# Patient Record
Sex: FEMALE | Race: WHITE | NOT HISPANIC OR LATINO | Employment: FULL TIME | URBAN - METROPOLITAN AREA
[De-identification: names, ages, dates, MRNs, and addresses within clinical notes are randomized per-mention and may not be internally consistent; named-entity substitution may affect disease eponyms.]

---

## 2018-02-16 ENCOUNTER — OFFICE VISIT (OUTPATIENT)
Dept: RETAIL CLINIC | Facility: CLINIC | Age: 28
End: 2018-02-16

## 2018-02-16 VITALS
SYSTOLIC BLOOD PRESSURE: 106 MMHG | OXYGEN SATURATION: 99 % | DIASTOLIC BLOOD PRESSURE: 68 MMHG | TEMPERATURE: 98.3 F | RESPIRATION RATE: 14 BRPM | HEART RATE: 69 BPM

## 2018-02-16 DIAGNOSIS — H66.002 ACUTE SUPPURATIVE OTITIS MEDIA OF LEFT EAR WITHOUT SPONTANEOUS RUPTURE OF TYMPANIC MEMBRANE, RECURRENCE NOT SPECIFIED: Primary | ICD-10-CM

## 2018-02-16 PROBLEM — R50.9 FEVER: Status: ACTIVE | Noted: 2018-02-16

## 2018-02-16 PROBLEM — H92.02 LEFT EAR PAIN: Status: ACTIVE | Noted: 2018-02-16

## 2018-02-16 PROCEDURE — 99213 OFFICE O/P EST LOW 20 MIN: CPT | Performed by: NURSE PRACTITIONER

## 2018-02-16 RX ORDER — AMOXICILLIN 875 MG/1
875 TABLET, COATED ORAL 2 TIMES DAILY
Qty: 20 TABLET | Refills: 0 | OUTPATIENT
Start: 2018-02-16 | End: 2019-03-25

## 2018-02-16 NOTE — PATIENT INSTRUCTIONS
Otitis Media, Adult  Otitis media is redness, soreness, and puffiness (swelling) in the space just behind your eardrum (middle ear). It may be caused by allergies or infection. It often happens along with a cold.  Follow these instructions at home:  · Take your medicine as told. Finish it even if you start to feel better.  · Only take over-the-counter or prescription medicines for pain, discomfort, or fever as told by your doctor.  · Follow up with your doctor as told.  Contact a doctor if:  · You have otitis media only in one ear, or bleeding from your nose, or both.  · You notice a lump on your neck.  · You are not getting better in 3-5 days.  · You feel worse instead of better.  Get help right away if:  · You have pain that is not helped with medicine.  · You have puffiness, redness, or pain around your ear.  · You get a stiff neck.  · You cannot move part of your face (paralysis).  · You notice that the bone behind your ear hurts when you touch it.  This information is not intended to replace advice given to you by your health care provider. Make sure you discuss any questions you have with your health care provider.  Document Released: 06/05/2009 Document Revised: 05/25/2017 Document Reviewed: 07/15/2014  [employer] Patient Education © 2017 [employer]    Earache, Adult  An earache, or ear pain, can be caused by many things, including:  · An infection.  · Ear wax buildup.  · Ear pressure.  · Something in the ear that should not be there (foreign body).  · A sore throat.  · Tooth problems.  · Jaw problems.  Treatment of the earache will depend on the cause. If the cause is not clear or cannot be determined, you may need to watch your symptoms until your earache goes away or until a cause is found.  Follow these instructions at home:  Pay attention to any changes in your symptoms. Take these actions to help with your pain:  · Take or apply over-the-counter and prescription medicines only as told by  your health care provider.  · If you were prescribed an antibiotic medicine, use it as told by your health care provider. Do not stop using the antibiotic even if you start to feel better.  · Do not put anything in your ear other than medicine that is prescribed by your health care provider.  · If directed, apply heat to the affected area as often as told by your health care provider. Use the heat source that your health care provider recommends, such as a moist heat pack or a heating pad.  ¨ Place a towel between your skin and the heat source.  ¨ Leave the heat on for 20-30 minutes.  ¨ Remove the heat if your skin turns bright red. This is especially important if you are unable to feel pain, heat, or cold. You may have a greater risk of getting burned.  · If directed, put ice on the ear:  ¨ Put ice in a plastic bag.  ¨ Place a towel between your skin and the bag.  ¨ Leave the ice on for 20 minutes, 2-3 times a day.  · Try resting in an upright position instead of lying down. This may help to reduce pressure in your ear and relieve pain.  · Chew gum if it helps to relieve your ear pain.  · Treat any allergies as told by your health care provider.  · Keep all follow-up visits as told by your health care provider. This is important.  Contact a health care provider if:  · Your pain does not improve within 2 days.  · Your earache gets worse.  · You have new symptoms.  · You have a fever.  Get help right away if:  · You have a severe headache.  · You have a stiff neck.  · You have trouble swallowing.  · You have redness or swelling behind your ear.  · You have fluid or blood coming from your ear.  · You have hearing loss.  · You feel dizzy.  This information is not intended to replace advice given to you by your health care provider. Make sure you discuss any questions you have with your health care provider.  Document Released: 08/04/2005 Document Revised: 08/15/2017 Document Reviewed: 06/12/2017  [employer]  Patient Education © 2017 [employer]  Talked to the patient about the diagnosis and educate the patient and advise to visit to PCP if the symptoms worsens

## 2018-02-16 NOTE — PROGRESS NOTES
Subjective   Zora Ashraf is a 27 y.o. female.     Earache    There is pain in the left ear. This is a new problem. The current episode started yesterday. The maximum temperature recorded prior to her arrival was 100.4 - 100.9 F. The fever has been present for 3 to 4 days. The pain is at a severity of 6/10. The pain is moderate. Associated symptoms include coughing. She has tried acetaminophen for the symptoms. The treatment provided mild relief. There is no history of a chronic ear infection or hearing loss.   Fever    This is a new problem. The current episode started yesterday. The problem has been waxing and waning. The maximum temperature noted was 100 to 100.9 F. The temperature was taken using an oral thermometer. Associated symptoms include congestion, coughing and ear pain. Pertinent negatives include no wheezing. She has tried acetaminophen for the symptoms. The treatment provided mild relief.        The following portions of the patient's history were reviewed and updated as appropriate: allergies, current medications, past family history, past medical history, past social history, past surgical history and problem list.    Review of Systems   Constitutional: Positive for fever.   HENT: Positive for congestion, ear pain and postnasal drip.          Left ear pain   Eyes: Negative.    Respiratory: Positive for cough. Negative for chest tightness and wheezing.    Cardiovascular: Negative.    Gastrointestinal: Negative.        Objective   Physical Exam   Constitutional: She appears well-developed and well-nourished.   HENT:   Head: Normocephalic and atraumatic.   Right Ear: External ear normal.   Left Ear: Tympanic membrane is injected, erythematous and bulging. Tympanic membrane mobility is abnormal.   Nose: Mucosal edema present. Right sinus exhibits no maxillary sinus tenderness and no frontal sinus tenderness. Left sinus exhibits no maxillary sinus tenderness and no frontal sinus tenderness.    Mouth/Throat: No oropharyngeal exudate, posterior oropharyngeal edema, posterior oropharyngeal erythema or tonsillar abscesses.   Eyes: Pupils are equal, round, and reactive to light.   Neck: Normal range of motion.   Cardiovascular: Normal rate, regular rhythm and normal heart sounds.    Pulmonary/Chest: Effort normal and breath sounds normal. No respiratory distress. She has no decreased breath sounds. She has no wheezes. She has no rhonchi. She has no rales. She exhibits no tenderness.   Abdominal: Soft. Bowel sounds are normal.   Nursing note and vitals reviewed.      Assessment/Plan   Zora was seen today for earache.    Diagnoses and all orders for this visit:    Acute suppurative otitis media of left ear without spontaneous rupture of tympanic membrane, recurrence not specified    Other orders  -     amoxicillin (AMOXIL) 875 MG tablet; Take 1 tablet by mouth 2 (Two) Times a Day.      Talked to the patient about the diagnosis and educate the patient and advise to visit to PCP if the symptoms worsens

## 2019-03-27 ENCOUNTER — OFFICE VISIT (OUTPATIENT)
Dept: FAMILY MEDICINE CLINIC | Facility: CLINIC | Age: 29
End: 2019-03-27

## 2019-03-27 VITALS
WEIGHT: 147 LBS | BODY MASS INDEX: 23.07 KG/M2 | OXYGEN SATURATION: 99 % | HEART RATE: 80 BPM | HEIGHT: 67 IN | RESPIRATION RATE: 16 BRPM | SYSTOLIC BLOOD PRESSURE: 110 MMHG | TEMPERATURE: 97.7 F | DIASTOLIC BLOOD PRESSURE: 70 MMHG

## 2019-03-27 DIAGNOSIS — L65.9 HAIR LOSS: ICD-10-CM

## 2019-03-27 DIAGNOSIS — R19.7 DIARRHEA, UNSPECIFIED TYPE: Primary | ICD-10-CM

## 2019-03-27 DIAGNOSIS — R53.82 CHRONIC FATIGUE: ICD-10-CM

## 2019-03-27 DIAGNOSIS — Z13.220 LIPID SCREENING: ICD-10-CM

## 2019-03-27 PROBLEM — H92.02 LEFT EAR PAIN: Status: RESOLVED | Noted: 2018-02-16 | Resolved: 2019-03-27

## 2019-03-27 PROBLEM — R50.9 FEVER: Status: RESOLVED | Noted: 2018-02-16 | Resolved: 2019-03-27

## 2019-03-27 PROCEDURE — 99203 OFFICE O/P NEW LOW 30 MIN: CPT | Performed by: NURSE PRACTITIONER

## 2019-03-27 NOTE — PROGRESS NOTES
Subjective   Zora Ashraf is a 28 y.o. female. Patient is here today for   Chief Complaint   Patient presents with   • Establish Care     np   • Fatigue     pt states has a history of lyme disease 2x    • Diarrhea     pt states has been going on for 6 wks 2/13/19          Vitals:    03/27/19 1306   BP: 110/70   Pulse: 80   Resp: 16   Temp: 97.7 °F (36.5 °C)   SpO2: 99%     The following portions of the patient's history were reviewed and updated as appropriate: allergies, current medications, past family history, past medical history, past social history, past surgical history and problem list.    Past Medical History:   Diagnosis Date   • Allergic    • Endometriosis    • PCOS (polycystic ovarian syndrome)       No Known Allergies   Social History     Socioeconomic History   • Marital status:      Spouse name: Not on file   • Number of children: Not on file   • Years of education: Not on file   • Highest education level: Not on file   Tobacco Use   • Smoking status: Never Smoker   • Smokeless tobacco: Never Used   Substance and Sexual Activity   • Alcohol use: Yes     Alcohol/week: 1.2 oz     Types: 2 Cans of beer per week     Comment: weekly   • Drug use: Defer   • Sexual activity: Defer        Current Outpatient Medications:   •  MAGNESIUM CITRATE PO, Take  by mouth., Disp: , Rfl:      Objective     History of Present Illness  Zora is here to establish care . She has no previous PCP. She has a history of PCOS and has had lyme disease twice in the past. She works as a teacher.   She c/o diarrhea 4-5 times a day for 6 weeks. She describes it as watery. She denies any abdominal pain, melena, or bloody stools. She has had weakness, fatigue, hair loss, and brittle nails. Her toe nails are falling are peeling too.   She recently went to [place] but it was after her symptoms started. She has not been on any antibiotics.   She went to the Urgent care a few days ago and they did not do anything for her but told  her to follow up with a PCP  She is fasting today for labs.     Review of Systems   Constitutional: Positive for chills, diaphoresis and fatigue. Negative for unexpected weight change.   HENT: Negative for congestion.    Eyes: Negative for visual disturbance.   Respiratory: Negative for cough, chest tightness and shortness of breath.    Cardiovascular: Negative for chest pain, palpitations and leg swelling.   Genitourinary: Negative.    Musculoskeletal: Negative for arthralgias.   Skin:        Hair loss, nail changes , brittle nails, toes peeling    Neurological: Positive for weakness and headaches. Negative for dizziness and numbness. Tremors: generalized        Physical Exam   Constitutional: Vital signs are normal. She appears well-developed and well-nourished. She appears ill. No distress.   HENT:   Head: Normocephalic.   Right Ear: Tympanic membrane and ear canal normal.   Left Ear: Tympanic membrane and ear canal normal.   Nose: Nose normal.   Mouth/Throat: Uvula is midline and oropharynx is clear and moist.   Cardiovascular: Normal rate, regular rhythm and normal heart sounds.   Pulmonary/Chest: Effort normal and breath sounds normal.   Abdominal: Soft. Normal appearance and bowel sounds are normal. There is no tenderness.   Neurological: She is alert.   Skin: Skin is warm and dry. There is pallor.       ASSESSMENT     Problem List Items Addressed This Visit     None      Visit Diagnoses     Diarrhea, unspecified type    -  Primary    Relevant Orders    Comprehensive Metabolic Panel    Giardia / Cryptosporidium Screen - Stool, Per Rectum    Stool Culture - Stool, Per Rectum    Clostridium Difficile EIA - Stool, Per Rectum    Chronic fatigue        Relevant Orders    CBC & Differential    Vitamin B12    Vitamin D 25 Hydroxy    Iron Profile    TSH Rfx On Abnormal To Free T4    Lyme, IgM, Early Test / Reflex    Hair loss        Relevant Orders    Vitamin D 25 Hydroxy    Iron Profile    Lipid screening         Relevant Orders    Lipid Panel With LDL / HDL Ratio          PLAN    Will check labs and call with results  Advised patient to bring in stool specimen. If negative may refer to GI for colonoscopy  Follow up as needed

## 2019-03-29 ENCOUNTER — TELEPHONE (OUTPATIENT)
Dept: FAMILY MEDICINE CLINIC | Facility: CLINIC | Age: 29
End: 2019-03-29

## 2019-03-29 NOTE — TELEPHONE ENCOUNTER
CALLED AND S/W PT AND ADVISED LAB RESULTS WERE NOT BACK YET AND THAT [facility] WOULD CALL PATIENT WHEN RESULTS CAME IN. SHE VOICED UNDERSTANDING.     ----- Message from Orly Pham sent at 3/29/2019 10:52 AM EDT -----  WANTING LAB RESULTS FROM [facility] 3/27/19 PLEASE CALL     [phone]

## 2019-04-01 ENCOUNTER — TELEPHONE (OUTPATIENT)
Dept: FAMILY MEDICINE CLINIC | Facility: CLINIC | Age: 29
End: 2019-04-01

## 2019-04-01 DIAGNOSIS — R19.7 DIARRHEA, UNSPECIFIED TYPE: Primary | ICD-10-CM

## 2019-04-01 LAB
BACTERIA SPEC CULT: NORMAL
BACTERIA SPEC CULT: NORMAL
C DIFF TOX A+B STL QL IA: NEGATIVE
CAMPYLOBACTER STL CULT: NORMAL
CRYPTOSP AG STL QL IA: NEGATIVE
E COLI SXT STL QL IA: NEGATIVE
G LAMBLIA AG STL QL IA: NEGATIVE
SALM + SHIG STL CULT: NORMAL

## 2019-04-01 NOTE — TELEPHONE ENCOUNTER
I called [facility] and her labs overall looked good   The lyme is still pending  Stool studies were negative  Will refer to GI for diarrhea for eval and possible cscope  Discussed seeing derm for hair loss but declined, is seeing allergist   Follow up as needed

## 2019-04-02 ENCOUNTER — TELEPHONE (OUTPATIENT)
Dept: FAMILY MEDICINE CLINIC | Facility: CLINIC | Age: 29
End: 2019-04-02

## 2019-04-02 DIAGNOSIS — R76.8 BORDERLINE RESULTS ON SEROLOGIC TESTING FOR LYME DISEASE: Primary | ICD-10-CM

## 2019-04-02 LAB
25(OH)D3+25(OH)D2 SERPL-MCNC: 30.3 NG/ML (ref 30–100)
ALBUMIN SERPL-MCNC: 5 G/DL (ref 3.5–5.2)
ALBUMIN/GLOB SERPL: 1.7 G/DL
ALP SERPL-CCNC: 37 U/L (ref 39–117)
ALT SERPL-CCNC: 19 U/L (ref 1–33)
AST SERPL-CCNC: 24 U/L (ref 1–32)
B BURGDOR IGG PATRN SER IB-IMP: NEGATIVE
B BURGDOR IGM PATRN SER IB-IMP: NEGATIVE
B BURGDOR IGM SER IA-ACNC: 1.17 INDEX (ref 0–0.79)
B BURGDOR18KD IGG SER QL IB: ABNORMAL
B BURGDOR23KD IGG SER QL IB: ABNORMAL
B BURGDOR23KD IGM SER QL IB: ABNORMAL
B BURGDOR28KD IGG SER QL IB: ABNORMAL
B BURGDOR30KD IGG SER QL IB: ABNORMAL
B BURGDOR39KD IGG SER QL IB: ABNORMAL
B BURGDOR39KD IGM SER QL IB: ABNORMAL
B BURGDOR41KD IGG SER QL IB: ABNORMAL
B BURGDOR41KD IGM SER QL IB: PRESENT
B BURGDOR45KD IGG SER QL IB: ABNORMAL
B BURGDOR58KD IGG SER QL IB: ABNORMAL
B BURGDOR66KD IGG SER QL IB: ABNORMAL
B BURGDOR93KD IGG SER QL IB: ABNORMAL
BASOPHILS # BLD AUTO: 0.04 10*3/MM3 (ref 0–0.2)
BASOPHILS NFR BLD AUTO: 0.6 % (ref 0–1.5)
BILIRUB SERPL-MCNC: 0.4 MG/DL (ref 0.2–1.2)
BUN SERPL-MCNC: 10 MG/DL (ref 6–20)
BUN/CREAT SERPL: 13.2 (ref 7–25)
CALCIUM SERPL-MCNC: 10 MG/DL (ref 8.6–10.5)
CHLORIDE SERPL-SCNC: 102 MMOL/L (ref 98–107)
CHOLEST SERPL-MCNC: 180 MG/DL (ref 0–200)
CO2 SERPL-SCNC: 25.4 MMOL/L (ref 22–29)
CREAT SERPL-MCNC: 0.76 MG/DL (ref 0.57–1)
EOSINOPHIL # BLD AUTO: 0.14 10*3/MM3 (ref 0–0.4)
EOSINOPHIL NFR BLD AUTO: 2.1 % (ref 0.3–6.2)
ERYTHROCYTE [DISTWIDTH] IN BLOOD BY AUTOMATED COUNT: 11.9 % (ref 12.3–15.4)
GLOBULIN SER CALC-MCNC: 2.9 GM/DL
GLUCOSE SERPL-MCNC: 101 MG/DL (ref 65–99)
HCT VFR BLD AUTO: 40.5 % (ref 34–46.6)
HDLC SERPL-MCNC: 62 MG/DL (ref 40–60)
HGB BLD-MCNC: 13 G/DL (ref 12–15.9)
IMM GRANULOCYTES # BLD AUTO: 0.02 10*3/MM3 (ref 0–0.05)
IMM GRANULOCYTES NFR BLD AUTO: 0.3 % (ref 0–0.5)
IRON SATN MFR SERPL: 22 % (ref 20–50)
IRON SERPL-MCNC: 105 MCG/DL (ref 37–145)
LDLC SERPL CALC-MCNC: 109 MG/DL (ref 0–100)
LDLC/HDLC SERPL: 1.76 {RATIO}
LYMPHOCYTES # BLD AUTO: 2.2 10*3/MM3 (ref 0.7–3.1)
LYMPHOCYTES NFR BLD AUTO: 33.1 % (ref 19.6–45.3)
MCH RBC QN AUTO: 29.9 PG (ref 26.6–33)
MCHC RBC AUTO-ENTMCNC: 32.1 G/DL (ref 31.5–35.7)
MCV RBC AUTO: 93.1 FL (ref 79–97)
MONOCYTES # BLD AUTO: 0.51 10*3/MM3 (ref 0.1–0.9)
MONOCYTES NFR BLD AUTO: 7.7 % (ref 5–12)
NEUTROPHILS # BLD AUTO: 3.73 10*3/MM3 (ref 1.4–7)
NEUTROPHILS NFR BLD AUTO: 56.2 % (ref 42.7–76)
NRBC BLD AUTO-RTO: 0 /100 WBC (ref 0–0)
PLATELET # BLD AUTO: 362 10*3/MM3 (ref 140–450)
POTASSIUM SERPL-SCNC: 4.4 MMOL/L (ref 3.5–5.2)
PROT SERPL-MCNC: 7.9 G/DL (ref 6–8.5)
RBC # BLD AUTO: 4.35 10*6/MM3 (ref 3.77–5.28)
SODIUM SERPL-SCNC: 140 MMOL/L (ref 136–145)
T4 FREE SERPL-MCNC: NORMAL NG/DL
TIBC SERPL-MCNC: 475 MCG/DL
TRIGL SERPL-MCNC: 45 MG/DL (ref 0–150)
TSH SERPL DL<=0.005 MIU/L-ACNC: 1.46 MIU/ML (ref 0.27–4.2)
UIBC SERPL-MCNC: 370 MCG/DL
VIT B12 SERPL-MCNC: 808 PG/ML (ref 211–946)
VLDLC SERPL CALC-MCNC: 9 MG/DL (ref 5–40)
WBC # BLD AUTO: 6.64 10*3/MM3 (ref 3.4–10.8)

## 2019-04-02 NOTE — TELEPHONE ENCOUNTER
Reviewed lyme IgM is elevated but equivocal   One of the bands is positive   I called ID and spoke with Florence since Sidra is out of town and she recommended I send a referral with the labs and someone will look at it and the labs and send it back if she does not need to be seen  I called the patient and left a message to discuss this

## 2019-04-02 NOTE — TELEPHONE ENCOUNTER
Discussed with the patient (see previous telephone note)  Will put in a referral and see if infectious disease will see her  I will let her know as soon as I hear something.   ----- Message from Marla Roberts sent at 4/2/2019 10:39 AM EDT -----  PATIENT WAS RETURNING YOUR CALL ABOUT RESULTS. PLEASE CALL PT BACK WHEN POSSIBLE -[phone].    THANK YOU

## 2019-04-08 ENCOUNTER — TELEPHONE (OUTPATIENT)
Dept: FAMILY MEDICINE CLINIC | Facility: CLINIC | Age: 29
End: 2019-04-08

## 2019-04-08 NOTE — TELEPHONE ENCOUNTER
CALLED PT AND LET HER KNOW     ----- Message from GWENDOLYN Marvin sent at 4/8/2019  9:03 AM EDT -----  Contact: PT   No I did not check pancreatic enzymes on her   ----- Message -----  From: Zhane Estes MA  Sent: 4/5/2019   4:05 PM  To: GWENDOLYN Marvin     PLEASE ADVISE   ----- Message -----  From: Victorina Yao RegSched Rep  Sent: 4/5/2019   3:44 PM  To: Zhane Estes MA    PT CALLED IN TO SEE IF ANY OF THE BLOOD WORK THAT WAS PREFORMED ON 3/27/19 INCLUDED CHECKING HER PANCREAS ENZYMES.  PLEASE CONTACT PT AND ADVISE.

## 2019-04-08 NOTE — TELEPHONE ENCOUNTER
I called the patient and discussed this. Pt had skin testing at an allergist and is allergic to multiple things. One of those is beef. A beef allergy can be associated with [place] tick bites so they recommend a lab test for beef allergy which is more accurate than a skin test  I reviewed her labs and answered her questions. Will see what the elimination diet does and what the gastro says  Will consider ordering a Alpha Gal IgE blood test   ----- Message from Zhane Estes MA sent at 4/8/2019  1:17 PM EDT -----   PLEASE ADVISE THANK YOU   ----- Message -----  From: Victorina Yao RegSched Rep  Sent: 4/8/2019  12:46 PM  To: Zhane Estes MA    PT WAS SEEN AT [facility]ER[facility] DR ON 4/6/19 AND THEY FOUND ENZYMES OF LYME DISEASE BUT THEY SENT IT OFF AND TO INFECTIOUS DISEASE WHICH BELIEVE THE LYME ENZYMES ARE THERE FROM WHEN SHE HAD LYME A FEW YEARS AGO.  HOWEVER THE ALLERGIST MENTIONED THAT SHE IS ALLERGIC TO BEEF, WHICH SHE HAS NOT BEEN ALLERGIC TO IN THE PAST.  HER ALLERGY DR ADVISED THAT THERE IS A DIFFERENT TIC ([place] TIC).  PT IS REQUESTING THAT [facility] LOOK OVER HER LABS AND SEE WHAT SHE THINKS.  PLEASE CONTACT PT [phone]

## 2019-04-10 ENCOUNTER — HOSPITAL ENCOUNTER (EMERGENCY)
Facility: HOSPITAL | Age: 29
Discharge: HOME | End: 2019-04-10
Attending: EMERGENCY MEDICINE | Admitting: EMERGENCY MEDICINE

## 2019-04-10 ENCOUNTER — APPOINTMENT (OUTPATIENT)
Dept: CT IMAGING | Facility: HOSPITAL | Age: 29
End: 2019-04-10

## 2019-04-10 ENCOUNTER — OFFICE VISIT (OUTPATIENT)
Dept: FAMILY MEDICINE CLINIC | Facility: CLINIC | Age: 29
End: 2019-04-10

## 2019-04-10 VITALS
OXYGEN SATURATION: 99 % | RESPIRATION RATE: 18 BRPM | DIASTOLIC BLOOD PRESSURE: 76 MMHG | HEART RATE: 89 BPM | HEIGHT: 67 IN | SYSTOLIC BLOOD PRESSURE: 120 MMHG | BODY MASS INDEX: 23.13 KG/M2 | WEIGHT: 147.4 LBS | TEMPERATURE: 98.2 F

## 2019-04-10 VITALS
SYSTOLIC BLOOD PRESSURE: 111 MMHG | BODY MASS INDEX: 23.07 KG/M2 | HEIGHT: 67 IN | OXYGEN SATURATION: 98 % | RESPIRATION RATE: 16 BRPM | HEART RATE: 70 BPM | TEMPERATURE: 98.3 F | DIASTOLIC BLOOD PRESSURE: 58 MMHG | WEIGHT: 147 LBS

## 2019-04-10 DIAGNOSIS — R19.7 DIARRHEA, UNSPECIFIED TYPE: Primary | ICD-10-CM

## 2019-04-10 DIAGNOSIS — R19.7 DIARRHEA, UNSPECIFIED TYPE: ICD-10-CM

## 2019-04-10 DIAGNOSIS — R10.30 LOWER ABDOMINAL PAIN: Primary | ICD-10-CM

## 2019-04-10 LAB
ADV 40+41 DNA STL QL NAA+NON-PROBE: NOT DETECTED
ALBUMIN SERPL-MCNC: 4.9 G/DL (ref 3.5–5.2)
ALBUMIN/GLOB SERPL: 1.8 G/DL
ALP SERPL-CCNC: 33 U/L (ref 39–117)
ALT SERPL W P-5'-P-CCNC: 20 U/L (ref 1–33)
ANION GAP SERPL CALCULATED.3IONS-SCNC: 12.7 MMOL/L
AST SERPL-CCNC: 28 U/L (ref 1–32)
ASTRO TYP 1-8 RNA STL QL NAA+NON-PROBE: NOT DETECTED
BASOPHILS # BLD AUTO: 0.04 10*3/MM3 (ref 0–0.2)
BASOPHILS NFR BLD AUTO: 0.5 % (ref 0–1.5)
BILIRUB SERPL-MCNC: 0.5 MG/DL (ref 0.2–1.2)
BILIRUB UR QL STRIP: NEGATIVE
BUN BLD-MCNC: 12 MG/DL (ref 6–20)
BUN/CREAT SERPL: 15 (ref 7–25)
C CAYETANENSIS DNA STL QL NAA+NON-PROBE: NOT DETECTED
CALCIUM SPEC-SCNC: 9.2 MG/DL (ref 8.6–10.5)
CAMPY SP DNA.DIARRHEA STL QL NAA+PROBE: NOT DETECTED
CHLORIDE SERPL-SCNC: 96 MMOL/L (ref 98–107)
CLARITY UR: ABNORMAL
CO2 SERPL-SCNC: 25.3 MMOL/L (ref 22–29)
COLOR UR: YELLOW
CREAT BLD-MCNC: 0.8 MG/DL (ref 0.57–1)
CRYPTOSP STL CULT: NOT DETECTED
DEPRECATED RDW RBC AUTO: 38 FL (ref 37–54)
E COLI DNA SPEC QL NAA+PROBE: NOT DETECTED
E HISTOLYT AG STL-ACNC: NOT DETECTED
EAEC PAA PLAS AGGR+AATA ST NAA+NON-PRB: NOT DETECTED
EC STX1 + STX2 GENES STL NAA+PROBE: NOT DETECTED
EOSINOPHIL # BLD AUTO: 0.08 10*3/MM3 (ref 0–0.4)
EOSINOPHIL NFR BLD AUTO: 1 % (ref 0.3–6.2)
EPEC EAE GENE STL QL NAA+NON-PROBE: NOT DETECTED
ERYTHROCYTE [DISTWIDTH] IN BLOOD BY AUTOMATED COUNT: 11.5 % (ref 12.3–15.4)
ETEC LTA+ST1A+ST1B TOX ST NAA+NON-PROBE: NOT DETECTED
G LAMBLIA DNA SPEC QL NAA+PROBE: NOT DETECTED
GFR SERPL CREATININE-BSD FRML MDRD: 84 ML/MIN/1.73
GLOBULIN UR ELPH-MCNC: 2.7 GM/DL
GLUCOSE BLD-MCNC: 86 MG/DL (ref 65–99)
GLUCOSE UR STRIP-MCNC: NEGATIVE MG/DL
HCG SERPL QL: NEGATIVE
HCT VFR BLD AUTO: 37.4 % (ref 34–46.6)
HGB BLD-MCNC: 12.4 G/DL (ref 12–15.9)
HGB UR QL STRIP.AUTO: NEGATIVE
HOLD SPECIMEN: NORMAL
HOLD SPECIMEN: NORMAL
IMM GRANULOCYTES # BLD AUTO: 0.03 10*3/MM3 (ref 0–0.05)
IMM GRANULOCYTES NFR BLD AUTO: 0.4 % (ref 0–0.5)
KETONES UR QL STRIP: ABNORMAL
LEUKOCYTE ESTERASE UR QL STRIP.AUTO: NEGATIVE
LIPASE SERPL-CCNC: 23 U/L (ref 13–60)
LYMPHOCYTES # BLD AUTO: 1.89 10*3/MM3 (ref 0.7–3.1)
LYMPHOCYTES NFR BLD AUTO: 23.5 % (ref 19.6–45.3)
MCH RBC QN AUTO: 30 PG (ref 26.6–33)
MCHC RBC AUTO-ENTMCNC: 33.2 G/DL (ref 31.5–35.7)
MCV RBC AUTO: 90.6 FL (ref 79–97)
MONOCYTES # BLD AUTO: 0.57 10*3/MM3 (ref 0.1–0.9)
MONOCYTES NFR BLD AUTO: 7.1 % (ref 5–12)
NEUTROPHILS # BLD AUTO: 5.43 10*3/MM3 (ref 1.4–7)
NEUTROPHILS NFR BLD AUTO: 67.5 % (ref 42.7–76)
NITRITE UR QL STRIP: NEGATIVE
NOROVIRUS GI+II RNA STL QL NAA+NON-PROBE: NOT DETECTED
NRBC BLD AUTO-RTO: 0 /100 WBC (ref 0–0)
P SHIGELLOIDES DNA STL QL NAA+NON-PROBE: NOT DETECTED
PH UR STRIP.AUTO: 8.5 [PH] (ref 5–8)
PLATELET # BLD AUTO: 313 10*3/MM3 (ref 140–450)
PMV BLD AUTO: 11 FL (ref 6–12)
POTASSIUM BLD-SCNC: 3.9 MMOL/L (ref 3.5–5.2)
PROT SERPL-MCNC: 7.6 G/DL (ref 6–8.5)
PROT UR QL STRIP: NEGATIVE
RBC # BLD AUTO: 4.13 10*6/MM3 (ref 3.77–5.28)
RV RNA STL NAA+PROBE: NOT DETECTED
SALMONELLA DNA SPEC QL NAA+PROBE: NOT DETECTED
SAPO I+II+IV+V RNA STL QL NAA+NON-PROBE: NOT DETECTED
SHIGELLA SP+EIEC IPAH STL QL NAA+PROBE: NOT DETECTED
SODIUM BLD-SCNC: 134 MMOL/L (ref 136–145)
SP GR UR STRIP: >=1.03 (ref 1–1.03)
UROBILINOGEN UR QL STRIP: ABNORMAL
V CHOLERAE DNA SPEC QL NAA+PROBE: NOT DETECTED
VIBRIO DNA SPEC NAA+PROBE: NOT DETECTED
WBC NRBC COR # BLD: 8.04 10*3/MM3 (ref 3.4–10.8)
WHOLE BLOOD HOLD SPECIMEN: NORMAL
WHOLE BLOOD HOLD SPECIMEN: NORMAL
YERSINIA STL CULT: NOT DETECTED

## 2019-04-10 PROCEDURE — 80053 COMPREHEN METABOLIC PANEL: CPT | Performed by: PHYSICIAN ASSISTANT

## 2019-04-10 PROCEDURE — 81003 URINALYSIS AUTO W/O SCOPE: CPT | Performed by: PHYSICIAN ASSISTANT

## 2019-04-10 PROCEDURE — 99214 OFFICE O/P EST MOD 30 MIN: CPT | Performed by: NURSE PRACTITIONER

## 2019-04-10 PROCEDURE — 84703 CHORIONIC GONADOTROPIN ASSAY: CPT | Performed by: PHYSICIAN ASSISTANT

## 2019-04-10 PROCEDURE — 99284 EMERGENCY DEPT VISIT MOD MDM: CPT

## 2019-04-10 PROCEDURE — 25010000002 IOPAMIDOL 61 % SOLUTION: Performed by: EMERGENCY MEDICINE

## 2019-04-10 PROCEDURE — 74177 CT ABD & PELVIS W/CONTRAST: CPT

## 2019-04-10 PROCEDURE — 87507 IADNA-DNA/RNA PROBE TQ 12-25: CPT | Performed by: PHYSICIAN ASSISTANT

## 2019-04-10 PROCEDURE — 85025 COMPLETE CBC W/AUTO DIFF WBC: CPT | Performed by: PHYSICIAN ASSISTANT

## 2019-04-10 PROCEDURE — 83690 ASSAY OF LIPASE: CPT | Performed by: PHYSICIAN ASSISTANT

## 2019-04-10 RX ORDER — IBUPROFEN 800 MG/1
800 TABLET ORAL ONCE
Status: COMPLETED | OUTPATIENT
Start: 2019-04-10 | End: 2019-04-10

## 2019-04-10 RX ORDER — SODIUM CHLORIDE 0.9 % (FLUSH) 0.9 %
10 SYRINGE (ML) INJECTION AS NEEDED
Status: DISCONTINUED | OUTPATIENT
Start: 2019-04-10 | End: 2019-04-10 | Stop reason: HOSPADM

## 2019-04-10 RX ORDER — DIPHENOXYLATE HYDROCHLORIDE AND ATROPINE SULFATE 2.5; .025 MG/1; MG/1
1 TABLET ORAL 4 TIMES DAILY PRN
Qty: 30 TABLET | Refills: 0 | Status: SHIPPED | OUTPATIENT
Start: 2019-04-10 | End: 2019-04-10

## 2019-04-10 RX ADMIN — SODIUM CHLORIDE, POTASSIUM CHLORIDE, SODIUM LACTATE AND CALCIUM CHLORIDE 1000 ML: 600; 310; 30; 20 INJECTION, SOLUTION INTRAVENOUS at 16:24

## 2019-04-10 RX ADMIN — IBUPROFEN 800 MG: 800 TABLET, FILM COATED ORAL at 17:12

## 2019-04-10 RX ADMIN — IOPAMIDOL 85 ML: 612 INJECTION, SOLUTION INTRAVENOUS at 17:27

## 2019-04-10 NOTE — PROGRESS NOTES
Subjective   Zora Ashraf is a 28 y.o. female. Patient is here today for   Chief Complaint   Patient presents with   • Diarrhea     pt still is complaining of diarrhea          Vitals:    04/10/19 1331   BP: 120/76   Pulse: 89   Resp: 18   Temp: 98.2 °F (36.8 °C)   SpO2: 99%     The following portions of the patient's history were reviewed and updated as appropriate: allergies, current medications, past family history, past medical history, past social history, past surgical history and problem list.    Past Medical History:   Diagnosis Date   • Allergic    • Endometriosis    • PCOS (polycystic ovarian syndrome)       No Known Allergies   Social History     Socioeconomic History   • Marital status:      Spouse name: Not on file   • Number of children: Not on file   • Years of education: Not on file   • Highest education level: Not on file   Tobacco Use   • Smoking status: Never Smoker   • Smokeless tobacco: Never Used   Substance and Sexual Activity   • Alcohol use: Yes     Alcohol/week: 1.2 oz     Types: 2 Cans of beer per week     Comment: weekly   • Drug use: Defer   • Sexual activity: Defer        Current Outpatient Medications:   •  MAGNESIUM CITRATE PO, Take  by mouth., Disp: , Rfl:      Objective     History of Present Illness Zora is here with c/o abdominal pain for a few days .This is a new problem. It is located on the upper left side and bilateral lower abdomen. It does not radiate. She states her stomach is constantly making gurgling sounds. She has not slept well for 2 days.  She continues to have  diarrhea which has been going on for two months. She has  8-10 episodes a day and is scheduled with GI next week. She denies bloody stools or melena. She describes then as oily and watery.   She went to an allergist and had skin testing done and she is allergic to multiple foods which she has eliminated from her diet. I reviewed the records in Epic from Dr Crenshaw.   She has had generalized weakness  but denies fever, chills, nightsweats, and weightloss.     Review of Systems   Constitutional: Positive for fatigue.   Respiratory: Negative.    Cardiovascular: Negative.    Gastrointestinal: Positive for abdominal pain and diarrhea. Negative for anal bleeding, blood in stool, nausea and vomiting.   Neurological: Positive for weakness (generalized ) and headaches (bilateral temporal radiates to neck, feels like a vice ). Negative for dizziness.   Psychiatric/Behavioral: Positive for sleep disturbance.     No visits with results within 2 Week(s) from this visit.   Latest known visit with results is:   Office Visit on 03/27/2019   Component Date Value Ref Range Status   • Glucose 03/27/2019 101* 65 - 99 mg/dL Final   • BUN 03/27/2019 10  6 - 20 mg/dL Final   • Creatinine 03/27/2019 0.76  0.57 - 1.00 mg/dL Final   • eGFR Non  Am 03/27/2019 89  >60 mL/min/1.73 Final   • eGFR African Am 03/27/2019 108  >60 mL/min/1.73 Final   • BUN/Creatinine Ratio 03/27/2019 13.2  7.0 - 25.0 Final   • Sodium 03/27/2019 140  136 - 145 mmol/L Final   • Potassium 03/27/2019 4.4  3.5 - 5.2 mmol/L Final   • Chloride 03/27/2019 102  98 - 107 mmol/L Final   • Total CO2 03/27/2019 25.4  22.0 - 29.0 mmol/L Final   • Calcium 03/27/2019 10.0  8.6 - 10.5 mg/dL Final   • Total Protein 03/27/2019 7.9  6.0 - 8.5 g/dL Final   • Albumin 03/27/2019 5.00  3.50 - 5.20 g/dL Final   • Globulin 03/27/2019 2.9  gm/dL Final   • A/G Ratio 03/27/2019 1.7  g/dL Final   • Total Bilirubin 03/27/2019 0.4  0.2 - 1.2 mg/dL Final   • Alkaline Phosphatase 03/27/2019 37* 39 - 117 U/L Final   • AST (SGOT) 03/27/2019 24  1 - 32 U/L Final   • ALT (SGPT) 03/27/2019 19  1 - 33 U/L Final   • Total Cholesterol 03/27/2019 180  0 - 200 mg/dL Final   • Triglycerides 03/27/2019 45  0 - 150 mg/dL Final   • HDL Cholesterol 03/27/2019 62* 40 - 60 mg/dL Final   • VLDL Cholesterol 03/27/2019 9  5 - 40 mg/dL Final   • LDL Cholesterol  03/27/2019 109* 0 - 100 mg/dL Final   • LDL/HDL  Ratio 03/27/2019 1.76   Final   • WBC 03/27/2019 6.64  3.40 - 10.80 10*3/mm3 Final   • RBC 03/27/2019 4.35  3.77 - 5.28 10*6/mm3 Final   • Hemoglobin 03/27/2019 13.0  12.0 - 15.9 g/dL Final   • Hematocrit 03/27/2019 40.5  34.0 - 46.6 % Final   • MCV 03/27/2019 93.1  79.0 - 97.0 fL Final   • MCH 03/27/2019 29.9  26.6 - 33.0 pg Final   • MCHC 03/27/2019 32.1  31.5 - 35.7 g/dL Final   • RDW 03/27/2019 11.9* 12.3 - 15.4 % Final   • Platelets 03/27/2019 362  140 - 450 10*3/mm3 Final   • Neutrophil Rel % 03/27/2019 56.2  42.7 - 76.0 % Final   • Lymphocyte Rel % 03/27/2019 33.1  19.6 - 45.3 % Final   • Monocyte Rel % 03/27/2019 7.7  5.0 - 12.0 % Final   • Eosinophil Rel % 03/27/2019 2.1  0.3 - 6.2 % Final   • Basophil Rel % 03/27/2019 0.6  0.0 - 1.5 % Final   • Neutrophils Absolute 03/27/2019 3.73  1.40 - 7.00 10*3/mm3 Final   • Lymphocytes Absolute 03/27/2019 2.20  0.70 - 3.10 10*3/mm3 Final   • Monocytes Absolute 03/27/2019 0.51  0.10 - 0.90 10*3/mm3 Final   • Eosinophils Absolute 03/27/2019 0.14  0.00 - 0.40 10*3/mm3 Final   • Basophils Absolute 03/27/2019 0.04  0.00 - 0.20 10*3/mm3 Final   • Immature Granulocyte Rel % 03/27/2019 0.3  0.0 - 0.5 % Final   • Immature Grans Absolute 03/27/2019 0.02  0.00 - 0.05 10*3/mm3 Final   • nRBC 03/27/2019 0.0  0.0 - 0.0 /100 WBC Final   • Vitamin B-12 03/27/2019 808  211 - 946 pg/mL Final   • 25 Hydroxy, Vitamin D 03/27/2019 30.3  30.0 - 100.0 ng/ml Final    Comment: Reference Range for Total Vitamin D 25(OH)  Deficiency <20.0 ng/mL  Insufficiency 21-29 ng/mL  Sufficiency  ng/mL  Toxicity >100 ng/ml     • TIBC 03/27/2019 475  mcg/dL Final   • UIBC 03/27/2019 370  mcg/dL Final   • Iron 03/27/2019 105  37 - 145 mcg/dL Final   • Iron Saturation 03/27/2019 22  20 - 50 % Final   • TSH 03/27/2019 1.46  0.27 - 4.2 mIU/mL Final   • Lyme Disease Ab, Quant, IgM 03/27/2019 1.17* 0.00 - 0.79 index Final    Comment:                                 Negative         <0.80                                   Equivocal  0.80 - 1.19                                  Positive         >1.19   IgM levels may peak at 3-6 weeks post infection, then   gradually decline.     • Giardia Ag, Stl 03/27/2019 Negative  Negative Final   • Cryptosporidium Antigen 03/27/2019 Negative  Negative Final   • Salmonella/Shigella Screen 03/27/2019 Final report   Final   • Result 1 03/27/2019 Comment   Final    No Salmonella or Shigella recovered.   • Campylobacter Culture 03/27/2019 Final report   Final   • Result 1 03/27/2019 Comment   Final    No Campylobacter species isolated.   • E coli, Shiga toxin Assay 03/27/2019 Negative  Negative Final   • C difficile Toxins A+B, EIA 03/27/2019 Negative  Negative Final   • IgG P93 Ab. 03/27/2019 Absent   Final   • IgG P66 Ab. 03/27/2019 Absent   Final   • IgG P58 Ab. 03/27/2019 Absent   Final   • IgG P45 Ab. 03/27/2019 Absent   Final   • IgG P41 Ab. 03/27/2019 Absent   Final   • IgG P39 Ab. 03/27/2019 Absent   Final   • IgG P30 Ab. 03/27/2019 Absent   Final   • IgG P28 Ab. 03/27/2019 Absent   Final   • IgG P23 Ab. 03/27/2019 Absent   Final   • IgG P18 Ab. 03/27/2019 Absent   Final   • Lyme IgG Western Blot Interpretati* 03/27/2019 Negative   Final    Comment:                      Positive: 5 of the following                                 Borrelia-specific bands:                                 18,23,28,30,39,41,45,58,                                 66, and 93.                       Negative: No bands or banding                                 patterns which do not                                 meet positive criteria.     • IgM P41 Ab. 03/27/2019 Present*  Final   • IgM P39 Ab. 03/27/2019 Absent   Final   • IgM P23 Ab. 03/27/2019 Absent   Final   • Lyme IgM Western Blot Interpretati* 03/27/2019 Negative   Final    Comment: Note: An equivocal or positive EIA result followed by a negative  Western Blot result is considered NEGATIVE. An equivocal or positive  EIA result followed by a  positive Western Blot is considered POSITIVE  by the CDC.  Positive: 2 of the following bands: 23,39 or 41  Negative: No bands or banding patterns which do not meet positive  criteria.  Criteria for positivity are those recommended by CDC/ASTPHLD.  p23=Osp C, r60=dvhkpkffm  Note:  Sera from individuals with the following may cross react in the  Lyme Western Blot assays: other spirochetal diseases (periodontal  disease, leptospirosis, relapsing fever, yaws, and pinta);  connective autoimmune (Rheumatoid Arthritis and Systemic Lupus  Erythematosus and also individuals with Antinuclear Antibody);  other infections ([place] Spotted Fever; Wendi-Barr Virus,  and Cytomegalovirus).     • Free T4 03/27/2019 CANCELED   Final-Edited    Comment: Test not performed    Result canceled by the ancillary.         Physical Exam   Constitutional: Vital signs are normal.   HENT:   Mouth/Throat: Mucous membranes are normal.   Cardiovascular: Normal rate, regular rhythm and normal heart sounds.   Pulmonary/Chest: Effort normal and breath sounds normal.   Abdominal: Normal appearance and bowel sounds are normal. There is tenderness in the right lower quadrant and left lower quadrant.   Neurological: She is alert.   Psychiatric:   Tearful    Nursing note and vitals reviewed.      ASSESSMENT     Problem List Items Addressed This Visit     None      Visit Diagnoses     Lower abdominal pain    -  Primary    Diarrhea, unspecified type              PLAN    Labs a few weeks ago were unremarkable. Lyme Igm antibody was elevated but western bot was negative, ID reviewed her records and states that she does not need any further workup from them. Overall she feels terrible. I recommend that she go to the ER for CT scan, labs and IV fluids. She is scheduled to go out of town in 2 days for a wedding.   Follow up with GI as scheduled   Follow up as needed

## 2019-04-10 NOTE — DISCHARGE INSTRUCTIONS
Stay well-hydrated.  Recheck GI as scheduled.  Take the medications as prescribed.  You can try imodium as needed for your diarrhea.  Return to the ER for severe pain, intractable vomiting, fever >100.4, new or worsening symptoms, any concerns.

## 2019-04-10 NOTE — ED NOTES
Patient c/o intermittent upper abdominal pain with nausea and watery diarrhea x8 weeks. Patient scheduled to see GI Dr. Rubin next week, patient to ED today because she is concerned she is dehydrated and notes generalized weakness. Patient notes pain worse after eating. Patient denies fever, chills or urinary complaints.      Jocy Padilla RN  04/10/19 [id number]

## 2019-04-10 NOTE — ED PROVIDER NOTES
MD ATTESTATION NOTE  The ELI and I have discussed this patient's history, physical exam, and treatment plan.  I have reviewed the documentation and personally had a face to face interaction with the patient. I affirm the documentation and agree with the treatment and plan.  The attached note describes my personal findings.    Hx- Pt presents c/o intermittent diffuse upper abd pain that began about two months ago. It is worse when eating food and had not been better with imodium. Pt also reports having watery diarrhea (10-15 episodes daily), nausea, vomiting, and generalized weakness. Pt has been test by her PCP for C Diff., which was negative. Pt denies hives. Pt has a GI appointment on 4/17/19. Pt also reports that she has recently traveled to [place], but states that she was having the Sx prior to leaving.     Pt reports that she does not eat any carbs.     PEx-  Constitution- NAD  Heart- RRR, no murmur  Lungs- CTAB, no respiratory distress  Abd- soft, non-tender, no rebound, no guarding  Neuro- A/Ox3    Plan- Labs and imaging reviewed. Plan is to d/c the pt home to f/u with her GI.     Documentation assistance provided by meghan Tate for Dr. Amezcua.  Information recorded by the scribe was done at my direction and has been verified and validated by me.       Davion Tate  04/10/19 1921       Timothy Amezcua MD  04/10/19 5941

## 2019-04-10 NOTE — ED PROVIDER NOTES
EMERGENCY DEPARTMENT ENCOUNTER    Room Number:  25/25  Date seen:  4/10/2019  Time seen: 3:56 PM  PCP: Rosy Portillo APRN   Gastroenterologist- Dr Forde (has appt on 4/17/19)  History provided by- patient    HPI:  Chief complaint: diarrhea  Context:Zora Ashraf is a 28 y.o. female who has hx of previous lyme disease, PCOS, and endometriosis and past surgical hx of appendectomy and laparotomy. She presents to the ED with c/o watery diarrhea that started about 2 months ago. She states that she has approximately 10 to 12 episodes of diarrhea daily which have not been alleviated with imodium. She also reports having postprandial diffuse upper abd pain, nausea, vomiting (occurred for 1 day about 3 weeks ago, resolved), and generalized weakness. She denies black or bloody stools, pain and difficulty with urination, loss of appetite, chest pain, dyspnea, fever, chills, recent abx use, and consumption of well water. She notes that she recently traveled to [facility] but her sx were ongoing before then. She also states that about 2 weeks ago, she was seen by PMD and allergist for this, underwent lyme IgM antibody which was elevated but had negative western blot, had negative C Diff toxin PCR, and had labs and allergy testing performed which were unremarkable. Additionally, she reports that she followed up with PMD today who referred her to ED for further evaluation. She is scheduled to see gastroenterology on 4/17/19. Pt has no other complaints at this time.     Onset: gradual  Location: GI  Radiation: none  Duration: started about 2 months ago  Timing: intermittent  Character: watery diarrhea  Aggravating Factors: none  Alleviating Factors: none  Severity: moderate    MEDICAL RECORD REVIEW  On 3/27/19, WBC count was 6.64, BUN was 10, creatinine was 0.76, TSH was 1.46, giardia/cryptosporidium stool culture was negative, and C Diff toxin PCR was negative.     ALLERGIES  Patient has no known allergies.    PAST MEDICAL  HISTORY  Active Ambulatory Problems     Diagnosis Date Noted   • No Active Ambulatory Problems     Resolved Ambulatory Problems     Diagnosis Date Noted   • Left ear pain 02/16/2018   • Fever 02/16/2018     Past Medical History:   Diagnosis Date   • Allergic    • Endometriosis    • Lyme disease    • PCOS (polycystic ovarian syndrome)        PAST SURGICAL HISTORY  Past Surgical History:   Procedure Laterality Date   • APPENDECTOMY     • ENDOMETRIAL ABLATION     • UTERINE SEPTUM REMOVAL      reshaped        FAMILY HISTORY  Family History   Problem Relation Age of Onset   • Hypertension Father    • Hypertension Sister        SOCIAL HISTORY  Social History     Socioeconomic History   • Marital status:      Spouse name: Not on file   • Number of children: Not on file   • Years of education: Not on file   • Highest education level: Not on file   Tobacco Use   • Smoking status: Never Smoker   • Smokeless tobacco: Never Used   Substance and Sexual Activity   • Alcohol use: Yes     Alcohol/week: 1.2 oz     Types: 2 Cans of beer per week     Comment: weekly   • Drug use: Defer   • Sexual activity: Defer       REVIEW OF SYSTEMS  Review of Systems   Constitutional: Negative for chills and fever.   HENT: Negative.    Eyes: Negative.    Respiratory: Negative for shortness of breath.    Cardiovascular: Negative for chest pain.   Gastrointestinal: Positive for abdominal pain (diffuse upper abd pain), diarrhea (watery diarrhea), nausea and vomiting (occurred for 1 day about 3 weeks ago, resolved).   Genitourinary: Negative.  Negative for difficulty urinating and dysuria.   Musculoskeletal: Negative.    Skin: Negative.    Neurological: Positive for weakness (generalized weakness, no focal weakness).   Psychiatric/Behavioral: Negative.        PHYSICAL EXAM  ED Triage Vitals   Temp Heart Rate Resp BP SpO2   04/10/19 1418 04/10/19 1418 04/10/19 1516 04/10/19 1515 04/10/19 1418   98.3 °F (36.8 °C) 92 16 129/87 98 % WNL      Temp  src Heart Rate Source Patient Position BP Location FiO2 (%)   04/10/19 1418 04/10/19 1418 04/10/19 1515 -- --   Tympanic Monitor Lying       Physical Exam   Constitutional: She is oriented to person, place, and time and well-developed, well-nourished, and in no distress.   HENT:   Head: Normocephalic and atraumatic.   Right Ear: External ear normal.   Left Ear: External ear normal.   Nose: Nose normal.   Mouth/Throat: Mucous membranes are normal. Mucous membranes are not dry.   Eyes: Conjunctivae are normal.   Neck: Normal range of motion.   Cardiovascular: Normal rate and regular rhythm.   Pulmonary/Chest: Effort normal and breath sounds normal.   Abdominal:   Normal bowel sounds  Soft  Nontender  Nondistended  No rebound, guarding, or rigidity  No appreciable organomegaly  No CVA tenderness   Musculoskeletal: Normal range of motion.   Neurological: She is alert and oriented to person, place, and time.   Skin: Skin is warm and dry.   Psychiatric: Affect normal.   Nursing note and vitals reviewed.      LAB RESULTS  Recent Results (from the past 24 hour(s))   Light Blue Top    Collection Time: 04/10/19  3:21 PM   Result Value Ref Range    Extra Tube hold for add-on    Green Top (Gel)    Collection Time: 04/10/19  3:21 PM   Result Value Ref Range    Extra Tube Hold for add-ons.    Lavender Top    Collection Time: 04/10/19  3:21 PM   Result Value Ref Range    Extra Tube hold for add-on    Gold Top - SST    Collection Time: 04/10/19  3:21 PM   Result Value Ref Range    Extra Tube Hold for add-ons.    Lipase    Collection Time: 04/10/19  3:21 PM   Result Value Ref Range    Lipase 23 13 - 60 U/L   hCG, Serum, Qualitative    Collection Time: 04/10/19  3:21 PM   Result Value Ref Range    HCG Qualitative Negative Negative   Comprehensive Metabolic Panel    Collection Time: 04/10/19  3:21 PM   Result Value Ref Range    Glucose 86 65 - 99 mg/dL    BUN 12 6 - 20 mg/dL    Creatinine 0.80 0.57 - 1.00 mg/dL    Sodium 134 (L) 136 -  145 mmol/L    Potassium 3.9 3.5 - 5.2 mmol/L    Chloride 96 (L) 98 - 107 mmol/L    CO2 25.3 22.0 - 29.0 mmol/L    Calcium 9.2 8.6 - 10.5 mg/dL    Total Protein 7.6 6.0 - 8.5 g/dL    Albumin 4.90 3.50 - 5.20 g/dL    ALT (SGPT) 20 1 - 33 U/L    AST (SGOT) 28 1 - 32 U/L    Alkaline Phosphatase 33 (L) 39 - 117 U/L    Total Bilirubin 0.5 0.2 - 1.2 mg/dL    eGFR Non African Amer 84 >60 mL/min/1.73    Globulin 2.7 gm/dL    A/G Ratio 1.8 g/dL    BUN/Creatinine Ratio 15.0 7.0 - 25.0    Anion Gap 12.7 mmol/L   CBC Auto Differential    Collection Time: 04/10/19  3:21 PM   Result Value Ref Range    WBC 8.04 3.40 - 10.80 10*3/mm3    RBC 4.13 3.77 - 5.28 10*6/mm3    Hemoglobin 12.4 12.0 - 15.9 g/dL    Hematocrit 37.4 34.0 - 46.6 %    MCV 90.6 79.0 - 97.0 fL    MCH 30.0 26.6 - 33.0 pg    MCHC 33.2 31.5 - 35.7 g/dL    RDW 11.5 (L) 12.3 - 15.4 %    RDW-SD 38.0 37.0 - 54.0 fl    MPV 11.0 6.0 - 12.0 fL    Platelets 313 140 - 450 10*3/mm3    Neutrophil % 67.5 42.7 - 76.0 %    Lymphocyte % 23.5 19.6 - 45.3 %    Monocyte % 7.1 5.0 - 12.0 %    Eosinophil % 1.0 0.3 - 6.2 %    Basophil % 0.5 0.0 - 1.5 %    Immature Grans % 0.4 0.0 - 0.5 %    Neutrophils, Absolute 5.43 1.40 - 7.00 10*3/mm3    Lymphocytes, Absolute 1.89 0.70 - 3.10 10*3/mm3    Monocytes, Absolute 0.57 0.10 - 0.90 10*3/mm3    Eosinophils, Absolute 0.08 0.00 - 0.40 10*3/mm3    Basophils, Absolute 0.04 0.00 - 0.20 10*3/mm3    Immature Grans, Absolute 0.03 0.00 - 0.05 10*3/mm3    nRBC 0.0 0.0 - 0.0 /100 WBC   Gastrointestinal Panel, PCR - Stool, Per Rectum    Collection Time: 04/10/19  4:23 PM   Result Value Ref Range    Campylobacter Not Detected Not Detected, Invalid    Plesiomonas shigelloides Not Detected Not Detected    Salmonella Not Detected Not Detected    Vibrio Not Detected Not Detected    Vibrio cholerae Not Detected Not Detected    Yersinia enterocolitica Not Detected Not Detected    Enteroaggregative E. coli (EAEC) Not Detected Not Detected    Enteropathogenic E.  coli (EPEC) Not Detected Not Detected    Enterotoxigenic E. coli (ETEC) lt/st Not Detected Not Detected    Shiga-like toxin-producing E. coli (STEC) stx1/stx2 Not Detected Not Detected    E. coli O157 Not Detected Not Detected    Shigella/Enteroinvasive E. coli (EIEC) Not Detected Not Detected    Cryptosporidium Not Detected Not Detected, Invalid    Cyclospora cayetanensis Not Detected Not Detected    Entamoeba histolytica Not Detected Not Detected    Giardia lamblia Not Detected Not Detected    Adenovirus F40/41 Not Detected Not Detected    Astrovirus Not Detected Not Detected    Norovirus GI/GII Not Detected Not Detected    Rotavirus A Not Detected Not Detected    Sapovirus (I, II, IV or V) Not Detected Not Detected   Urinalysis With Microscopic If Indicated (No Culture) - Urine, Clean Catch    Collection Time: 04/10/19  5:39 PM   Result Value Ref Range    Color, UA Yellow Yellow, Straw    Appearance, UA Cloudy (A) Clear    pH, UA 8.5 (H) 5.0 - 8.0    Specific Gravity, UA >=1.030 1.005 - 1.030    Glucose, UA Negative Negative    Ketones, UA 80 mg/dL (3+) (A) Negative    Bilirubin, UA Negative Negative    Blood, UA Negative Negative    Protein, UA Negative Negative    Leuk Esterase, UA Negative Negative    Nitrite, UA Negative Negative    Urobilinogen, UA 0.2 E.U./dL 0.2 - 1.0 E.U./dL       I ordered the above labs and reviewed the results      RADIOLOGY      CT Abdomen Pelvis With Contrast (Final result)   Result time 04/10/19 17:41:04   Final result by Tan Tan MD (04/10/19 17:41:04)                Impression:          1. No focal acute inflammatory process of bowel is identified. Mild  nonspecific pelvic free fluid.  2. No obstructive uropathy. Left adnexal cyst.     Discussed by telephone with Abida Luis Miguel at 1740, 04/10/2019.     This report was finalized on 4/10/2019 5:41 PM by Dr. Tan Tan M.D.               Narrative:    CT ABDOMEN PELVIS W CONTRAST-     INDICATIONS: Abdominal  pain     TECHNIQUE: Radiation dose reduction techniques were utilized, including  automated exposure control and exposure modulation based on body size.  Enhanced ABDOMEN AND PELVIS CT     COMPARISON: None available     FINDINGS:     Unremarkable appearance of the liver, gallbladder, spleen, adrenal  glands, pancreas, kidneys, bladder. Small fluid is suggested in the  endometrial canal. Left adnexal cyst, 2.3 cm, follow-up pelvic  ultrasound in 4-6 weeks can characterize resolution.     No bowel obstruction or abnormal bowel thickening is identified. No  appendix is seen, compatible with stated history of prior appendectomy.     No free intraperitoneal gas. Small pelvic free fluid.     Scattered small mesenteric and para-aortic lymph nodes are seen that are  not significant by size criteria.     Abdominal aorta is not aneurysmal.     The lung bases are clear.     Unilateral left L5 spondylolysis. No acute fracture is identified.  Pectus excavatum configuration.                         I ordered the above noted radiological studies and reviewed the images on the PACS system.  Spoke with Dr. Tan (radiologist) regarding CT abd/pel scan results    MEDICATIONS GIVEN IN ER  Medications   sodium chloride 0.9 % flush 10 mL (not administered)   lactated ringers bolus 1,000 mL (0 mL Intravenous Stopped 4/10/19 1812)   ibuprofen (ADVIL,MOTRIN) tablet 800 mg (800 mg Oral Given 4/10/19 1712)   iopamidol (ISOVUE-300) 61 % injection 100 mL (85 mL Intravenous Given by Other 4/10/19 1727)         PROCEDURES  Procedures      PROGRESS AND CONSULTS    Progress Notes:       1606- Ordered IV lactated ringers bolus for hydration. Ordered CT abd/pel, lipase, HCG qualitative, UA, blood work, and GI panel PCR for further evaluation.     1709- Per RN, pt c/o headache for which she usually takes ibuprofen. Ordered ibuprofen.     1740- Obtained CT abd/pel results-> small left adnexal cyst, no acute process.     1845- Reviewed pt's history  "and workup with Dr. Amezcua.  After a bedside evaluation; Dr Amezcua agrees with the plan of care.     1909- Family is now at pt's bedside. Rechecked pt. She is resting comfortably and is in no acute distress. Discussed with pt and family about all pertinent results including normal WBC count, normal renal function, negative GI panel PCR, UA findings (ketones in urine suggestive of dehydration), otherwise stable labs, and CT abd/pel findings (small left adnexal cyst (possibly ovarian), no acute process). Pt states that she has hx of PCOS. Informed pt that she received IV fluids for hydration in ED. Instructed pt to well hydrate. Provided discharge instructions for diarrhea (adult). Counseled pt on the importance of following up closely with gastroenterologist as scheduled on 4/17/19 and with PMD for recheck and for further testing/treatment as needed. RTER warnings given. Pt understands and agrees with plan. Addressed all questions.        Disposition vitals:  /78 (BP Location: Right arm, Patient Position: Lying)   Pulse 72   Temp 98.3 °F (36.8 °C) (Tympanic)   Resp 18   Ht 170.2 cm (67\")   Wt 66.7 kg (147 lb)   LMP 03/24/2019   SpO2 100%   BMI 23.02 kg/m²       DIAGNOSIS  Final diagnoses:   Diarrhea, unspecified type     DISCHARGE    Patient discharged in stable condition.    Reviewed implications of results, diagnosis, responsibility to follow up, warning signs and symptoms of possible worsening, potential complications and reasons to return to ER.    Patient/Family voiced understanding of above instructions.    Discussed plan for discharge, as there is no emergent indication for admission.  Pt/family is agreeable and understands need for follow up and repeat testing.  Pt is aware that discharge does not mean that nothing is wrong but it indicates no emergency is present and they must continue care with follow-up as given below or physician of their choice.     FOLLOW-UP  Rosy Portillo, " APRN  [address] [facility] [id number]  [phone]          Jennifer Forde MD  [address] [id number] [phone]      as scheduled      Documentation assistance provided by meghan Long for Abida Bolanos PA-C.  Information recorded by the scribe was done at my direction and has been verified and validated by me.         Suzanne Long  04/10/19 2017       Abida Bolanos PA  04/10/19 0441

## 2019-04-11 ENCOUNTER — TELEPHONE (OUTPATIENT)
Dept: FAMILY MEDICINE CLINIC | Facility: CLINIC | Age: 29
End: 2019-04-11

## 2019-04-11 NOTE — TELEPHONE ENCOUNTER
DELANO JARQUIN ASKED ME TO CALL PATIENT TO SEE HOW SHE IS DOING AFTER GOING TO ER YESTERDAY. I CALLED AND S/W PT AND SHE SAID THAT SHE WAS STILL HAVING THE SAME SYMPTOMS AND THAT SHE HAD STAYED HOME AND WAS TRYING TO DRINK AS MUCH WATER AS SHE COULD, AS THEY SAID SHE WAS DEHYDRATED. PATIENT SAID HOSPITAL ADVISED HER TO ONLY TAKE IMODIUM TO HELP WITH HER DIARRHEA. THEY WERE GOING TO PRESCRIBE HER LOMOTIL, BUT THEY SAID IT COULD CAUSE A BLOCKAGE, SO ONLY TAKE THE IMODIUM. PT IS GOING TO SEE GASTRO ON 04/17/2019. KATI.

## 2019-04-17 ENCOUNTER — OFFICE VISIT (OUTPATIENT)
Dept: GASTROENTEROLOGY | Facility: CLINIC | Age: 29
End: 2019-04-17

## 2019-04-17 VITALS
SYSTOLIC BLOOD PRESSURE: 118 MMHG | TEMPERATURE: 98.6 F | WEIGHT: 146.4 LBS | HEIGHT: 67 IN | BODY MASS INDEX: 22.98 KG/M2 | DIASTOLIC BLOOD PRESSURE: 78 MMHG

## 2019-04-17 DIAGNOSIS — Z83.79 FAMILY HISTORY OF ULCERATIVE COLITIS: ICD-10-CM

## 2019-04-17 DIAGNOSIS — K59.1 FUNCTIONAL DIARRHEA: Primary | ICD-10-CM

## 2019-04-17 DIAGNOSIS — R11.0 NAUSEA: ICD-10-CM

## 2019-04-17 PROCEDURE — 99204 OFFICE O/P NEW MOD 45 MIN: CPT | Performed by: INTERNAL MEDICINE

## 2019-04-17 RX ORDER — SODIUM CHLORIDE, SODIUM LACTATE, POTASSIUM CHLORIDE, CALCIUM CHLORIDE 600; 310; 30; 20 MG/100ML; MG/100ML; MG/100ML; MG/100ML
30 INJECTION, SOLUTION INTRAVENOUS CONTINUOUS
Status: CANCELLED | OUTPATIENT
Start: 2019-05-28

## 2019-04-17 RX ORDER — HYOSCYAMINE SULFATE 0.125 MG
0.12 TABLET ORAL EVERY 6 HOURS PRN
Qty: 90 TABLET | Refills: 1 | Status: SHIPPED | OUTPATIENT
Start: 2019-04-17 | End: 2019-10-08

## 2019-04-17 NOTE — PATIENT INSTRUCTIONS
Hyoscyamine as needed for diarrhea    Schedule the colonoscopy    [place] diet      For any additional questions, concerns or changes to your condition after today's office visit please contact the office at [phone].

## 2019-04-17 NOTE — PROGRESS NOTES
Chief Complaint   Patient presents with   • Abdominal Pain   • Diarrhea   • Nausea       Subjective     HPI    Zora Ashraf is a 28 y.o. female with a past medical history noted below who presents for evaluation of diarrhea, abdominal pain, and diarrhea. Symptoms started about 2 months ago.   She denies any precipitants though she is a .  Timing has been daily.  She was having up to 15 BMs in a day.  This included nocturnal stools.  The diarrhea has been associated with lower abdominal pain.  Described as dull discomfort.  Lots of borborygmi.  She is having a little bit of nausea, only had one episode of vomiting.  None further.  She had to go to the ER 1 week ago for dehydration. She tried imodium without relief.  No blood in her stool, all loose and liquidy.  CT imaging was negative.  C diff was negative, GI PCR was negative.  Feeling run down, fatigued. Modest weight loss with symptoms.  For the past few days, she has decreased which she has been eating and has found that she has had fewer bowel movements though she is still having loose stools multiple times a day.    Mother with UC.  Paternal grandfather with CRC.  No other IBD.    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    S/p appendectomy, uterine polypectomy.    No smoking, no excess ETOH.        Past Medical History:   Diagnosis Date   • Allergic    • Endometriosis    • Lyme disease    • PCOS (polycystic ovarian syndrome)          Current Outpatient Medications:   •  hyoscyamine (ANASPAZ,LEVSIN) 0.125 MG tablet, Take 1 tablet by mouth Every 6 (Six) Hours As Needed for Cramping or Diarrhea., Disp: 90 tablet, Rfl: 1    No Known Allergies    Social History     Socioeconomic History   • Marital status:      Spouse name: Not on file   • Number of children: Not on file   • Years of education: Not on file   • Highest education level: Not on file   Tobacco Use   • Smoking status: Never Smoker   • Smokeless tobacco: Never Used   Substance and Sexual  Activity   • Alcohol use: Yes     Comment: occ    • Drug use: No   • Sexual activity: Defer       Family History   Problem Relation Age of Onset   • Hypertension Father    • Hypertension Sister    • Ulcerative colitis Mother        Review of Systems   Constitutional: Positive for fatigue. Negative for activity change and appetite change.   HENT: Negative for sore throat and trouble swallowing.    Respiratory: Negative.    Cardiovascular: Negative.    Gastrointestinal: Positive for abdominal distention, diarrhea and nausea. Negative for abdominal pain and blood in stool.   Endocrine: Negative for cold intolerance and heat intolerance.   Genitourinary: Negative for difficulty urinating, dysuria and frequency.   Musculoskeletal: Negative for arthralgias, back pain and myalgias.   Skin: Negative.    Hematological: Negative for adenopathy. Does not bruise/bleed easily.   All other systems reviewed and are negative.      Objective     Vitals:    04/17/19 1425   BP: 118/78   Temp: 98.6 °F (37 °C)         04/17/19  1425   Weight: 66.4 kg (146 lb 6.4 oz)     Body mass index is 22.93 kg/m².    Physical Exam   Constitutional: She is oriented to person, place, and time. She appears well-developed and well-nourished. No distress.   HENT:   Head: Normocephalic and atraumatic.   Right Ear: External ear normal.   Left Ear: External ear normal.   Nose: Nose normal.   Mouth/Throat: Oropharynx is clear and moist.   Eyes: Conjunctivae and EOM are normal. Right eye exhibits no discharge. Left eye exhibits no discharge. No scleral icterus.   Neck: Normal range of motion. Neck supple. No thyromegaly present.   No supraclavicular adenopathy   Cardiovascular: Normal rate, regular rhythm, normal heart sounds and intact distal pulses. Exam reveals no gallop.   No murmur heard.  No lower extremity edema   Pulmonary/Chest: Effort normal and breath sounds normal. No respiratory distress. She has no wheezes.   Abdominal: Soft. Normal appearance  and bowel sounds are normal. She exhibits no distension and no mass. There is no hepatosplenomegaly. There is no tenderness. There is no rigidity, no rebound and no guarding. No hernia.   Genitourinary:   Genitourinary Comments: Rectal exam deferred   Musculoskeletal: Normal range of motion. She exhibits no edema or tenderness.   No atrophy of upper or lower extremities.  Normal digits and nails of both hands.   Lymphadenopathy:     She has no cervical adenopathy.   Neurological: She is alert and oriented to person, place, and time. She displays no atrophy. Coordination normal.   Skin: Skin is warm and dry. No rash noted. She is not diaphoretic. No erythema.   Psychiatric: She has a normal mood and affect. Her behavior is normal. Judgment and thought content normal.   Vitals reviewed.      WBC   Date Value Ref Range Status   04/10/2019 8.04 3.40 - 10.80 10*3/mm3 Final   03/27/2019 6.64 3.40 - 10.80 10*3/mm3 Final     RBC   Date Value Ref Range Status   04/10/2019 4.13 3.77 - 5.28 10*6/mm3 Final   03/27/2019 4.35 3.77 - 5.28 10*6/mm3 Final     Hemoglobin   Date Value Ref Range Status   04/10/2019 12.4 12.0 - 15.9 g/dL Final     Hematocrit   Date Value Ref Range Status   04/10/2019 37.4 34.0 - 46.6 % Final     MCV   Date Value Ref Range Status   04/10/2019 90.6 79.0 - 97.0 fL Final     MCH   Date Value Ref Range Status   04/10/2019 30.0 26.6 - 33.0 pg Final     MCHC   Date Value Ref Range Status   04/10/2019 33.2 31.5 - 35.7 g/dL Final     RDW   Date Value Ref Range Status   04/10/2019 11.5 (L) 12.3 - 15.4 % Final     RDW-SD   Date Value Ref Range Status   04/10/2019 38.0 37.0 - 54.0 fl Final     MPV   Date Value Ref Range Status   04/10/2019 11.0 6.0 - 12.0 fL Final     Platelets   Date Value Ref Range Status   04/10/2019 313 140 - 450 10*3/mm3 Final     Neutrophil %   Date Value Ref Range Status   04/10/2019 67.5 42.7 - 76.0 % Final     Lymphocyte %   Date Value Ref Range Status   04/10/2019 23.5 19.6 - 45.3 % Final      Monocyte %   Date Value Ref Range Status   04/10/2019 7.1 5.0 - 12.0 % Final     Eosinophil %   Date Value Ref Range Status   04/10/2019 1.0 0.3 - 6.2 % Final     Basophil %   Date Value Ref Range Status   04/10/2019 0.5 0.0 - 1.5 % Final     Immature Grans %   Date Value Ref Range Status   04/10/2019 0.4 0.0 - 0.5 % Final     Neutrophils, Absolute   Date Value Ref Range Status   04/10/2019 5.43 1.40 - 7.00 10*3/mm3 Final     Lymphocytes, Absolute   Date Value Ref Range Status   04/10/2019 1.89 0.70 - 3.10 10*3/mm3 Final     Monocytes, Absolute   Date Value Ref Range Status   04/10/2019 0.57 0.10 - 0.90 10*3/mm3 Final     Eosinophils, Absolute   Date Value Ref Range Status   04/10/2019 0.08 0.00 - 0.40 10*3/mm3 Final     Basophils, Absolute   Date Value Ref Range Status   04/10/2019 0.04 0.00 - 0.20 10*3/mm3 Final     Immature Grans, Absolute   Date Value Ref Range Status   04/10/2019 0.03 0.00 - 0.05 10*3/mm3 Final     nRBC   Date Value Ref Range Status   04/10/2019 0.0 0.0 - 0.0 /100 WBC Final       Glucose   Date Value Ref Range Status   04/10/2019 86 65 - 99 mg/dL Final     Sodium   Date Value Ref Range Status   04/10/2019 134 (L) 136 - 145 mmol/L Final     Potassium   Date Value Ref Range Status   04/10/2019 3.9 3.5 - 5.2 mmol/L Final     CO2   Date Value Ref Range Status   04/10/2019 25.3 22.0 - 29.0 mmol/L Final     Chloride   Date Value Ref Range Status   04/10/2019 96 (L) 98 - 107 mmol/L Final     Anion Gap   Date Value Ref Range Status   04/10/2019 12.7 mmol/L Final     Creatinine   Date Value Ref Range Status   04/10/2019 0.80 0.57 - 1.00 mg/dL Final     BUN   Date Value Ref Range Status   04/10/2019 12 6 - 20 mg/dL Final     BUN/Creatinine Ratio   Date Value Ref Range Status   04/10/2019 15.0 7.0 - 25.0 Final     Calcium   Date Value Ref Range Status   04/10/2019 9.2 8.6 - 10.5 mg/dL Final     eGFR Non  Amer   Date Value Ref Range Status   04/10/2019 84 >60 mL/min/1.73 Final     Alkaline  Phosphatase   Date Value Ref Range Status   04/10/2019 33 (L) 39 - 117 U/L Final     Total Protein   Date Value Ref Range Status   04/10/2019 7.6 6.0 - 8.5 g/dL Final     ALT (SGPT)   Date Value Ref Range Status   04/10/2019 20 1 - 33 U/L Final     AST (SGOT)   Date Value Ref Range Status   04/10/2019 28 1 - 32 U/L Final     Comment:     Specimen hemolyzed.  Results may be affected.     Total Bilirubin   Date Value Ref Range Status   04/10/2019 0.5 0.2 - 1.2 mg/dL Final     Albumin   Date Value Ref Range Status   04/10/2019 4.90 3.50 - 5.20 g/dL Final     Globulin   Date Value Ref Range Status   04/10/2019 2.7 gm/dL Final     A/G Ratio   Date Value Ref Range Status   03/27/2019 1.7 g/dL Final         CT ABDOMEN PELVIS W CONTRAST-     INDICATIONS: Abdominal pain     TECHNIQUE: Radiation dose reduction techniques were utilized, including  automated exposure control and exposure modulation based on body size.  Enhanced ABDOMEN AND PELVIS CT     COMPARISON: None available     FINDINGS:     Unremarkable appearance of the liver, gallbladder, spleen, adrenal  glands, pancreas, kidneys, bladder. Small fluid is suggested in the  endometrial canal. Left adnexal cyst, 2.3 cm, follow-up pelvic  ultrasound in 4-6 weeks can characterize resolution.     No bowel obstruction or abnormal bowel thickening is identified. No  appendix is seen, compatible with stated history of prior appendectomy.     No free intraperitoneal gas. Small pelvic free fluid.     Scattered small mesenteric and para-aortic lymph nodes are seen that are  not significant by size criteria.     Abdominal aorta is not aneurysmal.     The lung bases are clear.     Unilateral left L5 spondylolysis. No acute fracture is identified.  Pectus excavatum configuration.           IMPRESSION:        1. No focal acute inflammatory process of bowel is identified. Mild  nonspecific pelvic free fluid.  2. No obstructive uropathy. Left adnexal cyst.     Discussed by telephone  with Abida Bolanos at 1740, 04/10/2019.      No notes on file    Assessment/Plan    Diarrhea: Acute issue since February.  Her infectious workup is been negative, imaging has been negative.  Differential of infectious versus inflammatory given her family history    Family history of ulcerative colitis: Her mother has UC.  I am worried that this acute diarrhea is a presentation of ulcerative colitis    Nausea: Mild, resolved    Plan  Trial of Levsin to help with her diarrhea  Ultimately she needs a colonoscopy to rule out inflammatory bowel disease and further determine the etiology of her diarrhea    Zora was seen today for abdominal pain, diarrhea and nausea.    Diagnoses and all orders for this visit:    Functional diarrhea  -     Case Request; Standing  -     Follow Anesthesia Guidelines / Standing Orders; Future  -     Implement Anesthesia Orders Day of Procedure; Standing  -     Obtain Informed Consent; Standing  -     Verify bowel prep was successful; Standing  -     lactated ringers infusion  -     Case Request    Nausea    Family history of ulcerative colitis  -     Case Request; Standing  -     Follow Anesthesia Guidelines / Standing Orders; Future  -     Implement Anesthesia Orders Day of Procedure; Standing  -     Obtain Informed Consent; Standing  -     Verify bowel prep was successful; Standing  -     lactated ringers infusion  -     Case Request    Other orders  -     hyoscyamine (ANASPAZ,LEVSIN) 0.125 MG tablet; Take 1 tablet by mouth Every 6 (Six) Hours As Needed for Cramping or Diarrhea.        I have discussed the above plan with the patient.  They verbalize understanding and are in agreement with the plan.  They have been advised to contact the office for any questions, concerns, or changes related to their health.    Dictated utilizing Dragon dictation

## 2019-05-28 ENCOUNTER — HOSPITAL ENCOUNTER (OUTPATIENT)
Facility: HOSPITAL | Age: 29
Setting detail: HOSPITAL OUTPATIENT SURGERY
Discharge: HOME | End: 2019-05-28
Attending: INTERNAL MEDICINE | Admitting: INTERNAL MEDICINE

## 2019-05-28 ENCOUNTER — ANESTHESIA EVENT (OUTPATIENT)
Dept: GASTROENTEROLOGY | Facility: HOSPITAL | Age: 29
End: 2019-05-28

## 2019-05-28 ENCOUNTER — ANESTHESIA (OUTPATIENT)
Dept: GASTROENTEROLOGY | Facility: HOSPITAL | Age: 29
End: 2019-05-28

## 2019-05-28 VITALS
BODY MASS INDEX: 23.49 KG/M2 | DIASTOLIC BLOOD PRESSURE: 58 MMHG | TEMPERATURE: 98.5 F | OXYGEN SATURATION: 100 % | SYSTOLIC BLOOD PRESSURE: 106 MMHG | WEIGHT: 149.7 LBS | HEIGHT: 67 IN | HEART RATE: 67 BPM | RESPIRATION RATE: 16 BRPM

## 2019-05-28 DIAGNOSIS — K59.1 FUNCTIONAL DIARRHEA: ICD-10-CM

## 2019-05-28 DIAGNOSIS — Z83.79 FAMILY HISTORY OF ULCERATIVE COLITIS: ICD-10-CM

## 2019-05-28 LAB
B-HCG UR QL: NEGATIVE
INTERNAL NEGATIVE CONTROL: NEGATIVE
INTERNAL POSITIVE CONTROL: POSITIVE
Lab: NORMAL

## 2019-05-28 PROCEDURE — 81025 URINE PREGNANCY TEST: CPT | Performed by: INTERNAL MEDICINE

## 2019-05-28 PROCEDURE — 25010000002 PROPOFOL 10 MG/ML EMULSION: Performed by: ANESTHESIOLOGY

## 2019-05-28 PROCEDURE — S0260 H&P FOR SURGERY: HCPCS | Performed by: INTERNAL MEDICINE

## 2019-05-28 PROCEDURE — 45380 COLONOSCOPY AND BIOPSY: CPT | Performed by: INTERNAL MEDICINE

## 2019-05-28 PROCEDURE — 88305 TISSUE EXAM BY PATHOLOGIST: CPT | Performed by: INTERNAL MEDICINE

## 2019-05-28 RX ORDER — PROPOFOL 10 MG/ML
VIAL (ML) INTRAVENOUS CONTINUOUS PRN
Status: DISCONTINUED | OUTPATIENT
Start: 2019-05-28 | End: 2019-05-28 | Stop reason: SURG

## 2019-05-28 RX ORDER — SODIUM CHLORIDE 0.9 % (FLUSH) 0.9 %
3 SYRINGE (ML) INJECTION AS NEEDED
Status: DISCONTINUED | OUTPATIENT
Start: 2019-05-28 | End: 2019-05-28 | Stop reason: HOSPADM

## 2019-05-28 RX ORDER — LIDOCAINE HYDROCHLORIDE 10 MG/ML
0.5 INJECTION, SOLUTION INFILTRATION; PERINEURAL ONCE AS NEEDED
Status: DISCONTINUED | OUTPATIENT
Start: 2019-05-28 | End: 2019-05-28 | Stop reason: HOSPADM

## 2019-05-28 RX ORDER — PROPOFOL 10 MG/ML
VIAL (ML) INTRAVENOUS AS NEEDED
Status: DISCONTINUED | OUTPATIENT
Start: 2019-05-28 | End: 2019-05-28 | Stop reason: SURG

## 2019-05-28 RX ORDER — SODIUM CHLORIDE, SODIUM LACTATE, POTASSIUM CHLORIDE, CALCIUM CHLORIDE 600; 310; 30; 20 MG/100ML; MG/100ML; MG/100ML; MG/100ML
30 INJECTION, SOLUTION INTRAVENOUS CONTINUOUS
Status: DISCONTINUED | OUTPATIENT
Start: 2019-05-28 | End: 2019-05-28 | Stop reason: HOSPADM

## 2019-05-28 RX ADMIN — PROPOFOL 150 MG: 10 INJECTION, EMULSION INTRAVENOUS at 09:07

## 2019-05-28 RX ADMIN — PROPOFOL 140 MCG/KG/MIN: 10 INJECTION, EMULSION INTRAVENOUS at 09:07

## 2019-05-28 RX ADMIN — PROPOFOL 100 MG: 10 INJECTION, EMULSION INTRAVENOUS at 09:14

## 2019-05-28 RX ADMIN — SODIUM CHLORIDE, POTASSIUM CHLORIDE, SODIUM LACTATE AND CALCIUM CHLORIDE 30 ML/HR: 600; 310; 30; 20 INJECTION, SOLUTION INTRAVENOUS at 08:52

## 2019-05-28 NOTE — ANESTHESIA PREPROCEDURE EVALUATION
Anesthesia Evaluation     Patient summary reviewed and Nursing notes reviewed   no history of anesthetic complications:               Airway   Mallampati: II  TM distance: >3 FB  Neck ROM: full  no difficulty expected  Dental - normal exam     Pulmonary - negative pulmonary ROS    breath sounds clear to auscultation  (-) shortness of breath, sleep apnea, decreased breath sounds, wheezes  Cardiovascular - normal exam  Exercise tolerance: good (4-7 METS)    Rhythm: regular  Rate: normal    (-) past MI, angina, CHF, orthopnea, PND, SAENZ, PVD      Neuro/Psych- negative ROS  (-) seizures, neuromuscular disease, TIA, CVA, dizziness/light headedness, weakness, numbness  GI/Hepatic/Renal/Endo - negative ROS   (-) liver disease, diabetes    Musculoskeletal (-) negative ROS    Abdominal  - normal exam   Substance History - negative use  (-) alcohol use, drug use     OB/GYN negative ob/gyn ROS         Other - negative ROS                       Anesthesia Plan    ASA 1     MAC     intravenous induction   Anesthetic plan, all risks, benefits, and alternatives have been provided, discussed and informed consent has been obtained with: patient.

## 2019-05-28 NOTE — ANESTHESIA POSTPROCEDURE EVALUATION
"Patient: Zora Ashraf    Procedure Summary     Date:  05/28/19 Room / Location:  [facility] ENDOSCOPY 8 / [facility] ENDOSCOPY    Anesthesia Start:  0903 Anesthesia Stop:  0933    Procedure:  COLONOSCOPY TO CECUM AND INTO TI WITH BIOPSIES (N/A ) Diagnosis:       Functional diarrhea      Family history of ulcerative colitis      (Functional diarrhea [K59.1])      (Family history of ulcerative colitis [Z83.79])    Surgeon:  Jennifer Forde MD Provider:  Fan Engle MD    Anesthesia Type:  MAC ASA Status:  1          Anesthesia Type: MAC  Last vitals  BP   102/66 (05/28/19 0936)   Temp   36.9 °C (98.5 °F) (05/28/19 0841)   Pulse   59 (05/28/19 0936)   Resp   16 (05/28/19 0936)     SpO2   99 % (05/28/19 0936)     Post Anesthesia Care and Evaluation    Patient location during evaluation: bedside  Patient participation: complete - patient participated  Level of consciousness: responsive to verbal stimuli and sleepy but conscious  Pain management: adequate  Airway patency: patent  Anesthetic complications: No anesthetic complications    Cardiovascular status: acceptable  Respiratory status: acceptable  Hydration status: acceptable    Comments: /66 (BP Location: Left arm)   Pulse 59   Temp 36.9 °C (98.5 °F) (Oral)   Resp 16   Ht 170.2 cm (67\")   Wt 67.9 kg (149 lb 11.2 oz)   LMP 05/03/2019   SpO2 99%   BMI 23.45 kg/m²       "

## 2019-05-29 ENCOUNTER — TELEPHONE (OUTPATIENT)
Dept: GASTROENTEROLOGY | Facility: CLINIC | Age: 29
End: 2019-05-29

## 2019-05-29 LAB
CYTO UR: NORMAL
LAB AP CASE REPORT: NORMAL
PATH REPORT.FINAL DX SPEC: NORMAL
PATH REPORT.GROSS SPEC: NORMAL

## 2019-05-29 NOTE — TELEPHONE ENCOUNTER
----- Message from Jennifer Forde MD sent at 5/29/2019 12:30 PM EDT -----  Biopsies of her colon showed normal tissue, no evidence of colitis    She should continue to use levsin, imodium to control her symptoms    Office follow up 3 months

## 2019-05-29 NOTE — TELEPHONE ENCOUNTER
Called pt and advised per Dr Forde that her bx of her colon showed normal tissue , no evidence of colitis.     She should continue to use levsin , imodium to control her symptoms.    F/u in 3month. Pt verb understanding and states she will call back to make appt.

## 2020-04-24 ENCOUNTER — TELEMEDICINE (OUTPATIENT)
Dept: INTERNAL MEDICINE | Facility: CLINIC | Age: 30
End: 2020-04-24

## 2020-04-24 ENCOUNTER — TELEPHONE (OUTPATIENT)
Dept: INTERNAL MEDICINE | Facility: CLINIC | Age: 30
End: 2020-04-24

## 2020-04-24 DIAGNOSIS — L70.0 CYSTIC ACNE: ICD-10-CM

## 2020-04-24 DIAGNOSIS — M75.22 BICEPS TENDONITIS ON LEFT: Primary | ICD-10-CM

## 2020-04-24 DIAGNOSIS — Z78.9 HISTORY OF DOG BITE: ICD-10-CM

## 2020-04-24 DIAGNOSIS — E28.2 PCOS (POLYCYSTIC OVARIAN SYNDROME): ICD-10-CM

## 2020-04-24 PROBLEM — R11.0 NAUSEA: Status: RESOLVED | Noted: 2019-04-17 | Resolved: 2020-04-24

## 2020-04-24 PROCEDURE — 99214 OFFICE O/P EST MOD 30 MIN: CPT | Performed by: INTERNAL MEDICINE

## 2020-04-24 RX ORDER — SPIRONOLACTONE 25 MG/1
25 TABLET ORAL DAILY
Qty: 30 TABLET | Refills: 5 | Status: SHIPPED | OUTPATIENT
Start: 2020-04-24 | End: 2020-09-16 | Stop reason: SDUPTHER

## 2020-04-24 NOTE — TELEPHONE ENCOUNTER
----- Message from Zora Ashraf sent at 4/24/2020  9:41 AM EDT -----  Regarding: Non-Urgent Medical Question  Contact: [phone]  Dr. Portillo,  A few weeks ago I had to make a trip to [facility] urgent care was closed, for stitches due to a dog bite in my upper arm. They advised me to do a follow up with you. While there, I got a Tetanus shot and they put me on antibiotics. The laceration and puncture hole seem to be healing fine, there is no sign of infection, but when I try to straighten my arm I have pain in my forearm that goes up around my inner elbow to the laceration site. If my arm is bent, I have no pain at all. I wanted to make sure this was just part of the healing process. Do you have any ideas?  While messaging you I also wanted to ask if I could try Spironolactone for my hormonal acne? It gets pretty bad due to PCOS.    Thank you,     Zora Ashraf

## 2020-04-24 NOTE — PROGRESS NOTES
"Arm Pain and Acne      HPI  Zora Ashraf is a 29 y.o. female  presents in acute care via video link in Epic. This visit is occurring during the COVID 19 pandemic.    You have chosen to receive care through a telehealth visit.  Do you consent to use a video/audio connection for your medical care today? Yes    Notes had to get stitches a few weeks ago from dog bite. Told to \"follow up with my doctor\". Took stitches out at home > 1 week ago.  Feels like wound looks good and is 'healing well'.  The week that took stitches out noted pain in lower part of arm. Only hurts if extends elbow all the way will have pain from mid anterior lower arm up to above wound site over mid bicep. No weakness.  Is TTP if arm straightened only. No swelling or stiffness of the elbow joint.  No weakness in muscles.   Notes had puncture wound and then laceration right over top of lower bicep. Was deep and was stitches as \"I had tissue hanging out of my arm\".  Only has some firmness over noted over laceration site, no other masses or nodes. Did get tetanus shot and Augmentin Rx and completed Abx.     Notes also has question about Rx for acne for PCOS related acne.  Wonders if could try a medication Spironolactone that her friend recommended.  Has 'really bad cystic acne' on chin and wants to see if will help. Does have PCOS hx.     Review of Systems   Constitution: Negative for chills and fever.   Skin: Positive for color change (over wound sites, expected pink coloration of scar). Negative for poor wound healing, rash and suspicious lesions.        Acne issue from PCOS noted     Musculoskeletal: Negative for joint pain, joint swelling, muscle weakness and stiffness.   Neurological: Negative for focal weakness.       The following portions of the patient's history were reviewed and updated as appropriate: allergies, current medications, past medical history, past social history and problem list.      Current Outpatient Medications:   •  " spironolactone (Aldactone) 25 MG tablet, Take 1 tablet by mouth Daily., Disp: 30 tablet, Rfl: 5    There were no vitals filed for this visit.  There is no height or weight on file to calculate BMI.      Physical Exam   Constitutional: She is oriented to person, place, and time. She appears well-developed and well-nourished. No distress.   HENT:   Head: Normocephalic.   Pulmonary/Chest: Effort normal. No respiratory distress.   Musculoskeletal:        Left elbow: She exhibits laceration (just above elbow on anterior arm over distal bicep (wound healed, noted scar)). She exhibits normal range of motion, no swelling and no deformity. Tenderness (over distal bicep when arm fully extended. ) found.   Lymphadenopathy:        Left axillary: No pectoral and no lateral adenopathy present.       Left: No epitrochlear adenopathy present.   Neurological: She is alert and oriented to person, place, and time.   Psychiatric: She has a normal mood and affect. Her behavior is normal. Thought content normal.   Vitals reviewed.      Assessment/ Plan  Diagnoses and all orders for this visit:    Biceps tendonitis on left  -     Ambulatory Referral to Hand Surgery    History of dog bite  Comments:  ~ 3 weeks ago  Orders:  -     Ambulatory Referral to Hand Surgery    Cystic acne  -     spironolactone (Aldactone) 25 MG tablet; Take 1 tablet by mouth Daily.  -     Basic Metabolic Panel; Future    PCOS (polycystic ovarian syndrome)  -     spironolactone (Aldactone) 25 MG tablet; Take 1 tablet by mouth Daily.  -     Basic Metabolic Panel; Future        No follow-ups on file.      Discussion:  Zora Ashraf is a 29 y.o. female presents in acute care (new pt and issue to examiner today) via video link in Epic. This visit is occurring during the COVID 19 pandemic.   Pt with hx of dog bit on L upper arm with puncture wound on upper inner arm and laceration over distal bicep stitched in ED 4/7/20. Completed Augmentin Rx and got tetanus shot.   Pt notes good wound healing and appears healed on viewing today.  However, now with pain at distal bicep and over anterior elbow region when arm fully extended. Wound was deep and I am concerned about distal bicep tendon involvement in laceration.  Do not suspect complete tear of tendon, but concern for partial tearing. Will have pt see hand surgery for eval, but can cancel if sx resolve prior.  Advised to massage wound area through day and to allow arm to rest supported on table fully extended through day as able to keep ROM intact in arm. Call if any swelling, loss ROM, fever, or progression of pain.   PCOS with cystic acne. Desires trial of spironolactone for acne. Will start 25mg daily. Trend BMP in 3 weeks at [facility], order placed.     RTC as planned.     Total time of visit 25 minutes.

## 2020-05-14 ENCOUNTER — RESULTS ENCOUNTER (OUTPATIENT)
Dept: INTERNAL MEDICINE | Facility: CLINIC | Age: 30
End: 2020-05-14

## 2020-05-14 DIAGNOSIS — E28.2 PCOS (POLYCYSTIC OVARIAN SYNDROME): ICD-10-CM

## 2020-05-14 DIAGNOSIS — L70.0 CYSTIC ACNE: ICD-10-CM

## 2020-05-15 LAB
BUN SERPL-MCNC: 13 MG/DL (ref 6–20)
BUN/CREAT SERPL: 16 (ref 9–23)
CALCIUM SERPL-MCNC: 9.5 MG/DL (ref 8.7–10.2)
CHLORIDE SERPL-SCNC: 100 MMOL/L (ref 96–106)
CO2 SERPL-SCNC: 23 MMOL/L (ref 20–29)
CREAT SERPL-MCNC: 0.79 MG/DL (ref 0.57–1)
GLUCOSE SERPL-MCNC: 89 MG/DL (ref 65–99)
POTASSIUM SERPL-SCNC: 4.6 MMOL/L (ref 3.5–5.2)
SODIUM SERPL-SCNC: 138 MMOL/L (ref 134–144)

## 2020-09-16 ENCOUNTER — TELEPHONE (OUTPATIENT)
Dept: INTERNAL MEDICINE | Facility: CLINIC | Age: 30
End: 2020-09-16

## 2020-09-16 DIAGNOSIS — L70.0 CYSTIC ACNE: ICD-10-CM

## 2020-09-16 DIAGNOSIS — E28.2 PCOS (POLYCYSTIC OVARIAN SYNDROME): ICD-10-CM

## 2020-09-16 RX ORDER — SPIRONOLACTONE 25 MG/1
25 TABLET ORAL DAILY
Qty: 90 TABLET | Refills: 1 | Status: SHIPPED | OUTPATIENT
Start: 2020-09-16 | End: 2021-02-17 | Stop reason: SDUPTHER

## 2020-09-16 NOTE — TELEPHONE ENCOUNTER
Patient requested refill request for Spironolactone 25mg #90.     Ok to refill or does she need an appointment?   
rx sent to pharmacy, please let her know this was called in. She will be due for a physical in the spring with labs   
2020

## 2020-10-14 ENCOUNTER — TELEMEDICINE (OUTPATIENT)
Dept: INTERNAL MEDICINE | Facility: CLINIC | Age: 30
End: 2020-10-14

## 2020-10-14 DIAGNOSIS — G47.00 INSOMNIA, UNSPECIFIED TYPE: ICD-10-CM

## 2020-10-14 DIAGNOSIS — R41.840 DIFFICULTY CONCENTRATING: ICD-10-CM

## 2020-10-14 DIAGNOSIS — F41.1 GENERALIZED ANXIETY DISORDER: Primary | ICD-10-CM

## 2020-10-14 PROBLEM — L70.0 CYSTIC ACNE: Status: ACTIVE | Noted: 2020-10-14
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
## 2020-10-14 PROBLEM — L70.0 CYSTIC ACNE: Status: RESOLVED | Noted: 2020-04-24 | Resolved: 2020-10-14

## 2020-10-14 PROBLEM — S71.151A DOG BITE OF RIGHT THIGH: Status: ACTIVE | Noted: 2020-09-04

## 2020-10-14 PROBLEM — M25.50 ARTHRALGIA: Status: ACTIVE | Noted: 2020-10-14

## 2020-10-14 PROCEDURE — 99214 OFFICE O/P EST MOD 30 MIN: CPT | Performed by: NURSE PRACTITIONER

## 2020-10-14 RX ORDER — BUPROPION HYDROCHLORIDE 150 MG/1
150 TABLET ORAL DAILY
Qty: 30 TABLET | Refills: 3 | Status: SHIPPED | OUTPATIENT
Start: 2020-10-14 | End: 2020-11-11 | Stop reason: SDUPTHER

## 2020-10-14 NOTE — PROGRESS NOTES
Subjective   Zora Ashraf is a 30 y.o. female. Patient is here today for   Chief Complaint   Patient presents with   • Anxiety          There were no vitals filed for this visit.  There is no height or weight on file to calculate BMI.  The following portions of the patient's history were reviewed and updated as appropriate: allergies, current medications, past family history, past medical history, past social history, past surgical history and problem list.    Past Medical History:   Diagnosis Date   • Abdominal pain    • Allergic    • Diarrhea    • Endometriosis    • Lyme disease    • PCOS (polycystic ovarian syndrome)       No Known Allergies   Social History     Socioeconomic History   • Marital status:      Spouse name: Not on file   • Number of children: Not on file   • Years of education: Not on file   • Highest education level: Not on file   Tobacco Use   • Smoking status: Never Smoker   • Smokeless tobacco: Never Used   Substance and Sexual Activity   • Alcohol use: Yes     Comment: occ    • Drug use: No   • Sexual activity: Defer        Current Outpatient Medications:   •  buPROPion XL (Wellbutrin XL) 150 MG 24 hr tablet, Take 1 tablet by mouth Daily., Disp: 30 tablet, Rfl: 3  •  spironolactone (Aldactone) 25 MG tablet, Take 1 tablet by mouth Daily., Disp: 90 tablet, Rfl: 1     Objective     Zora is a 30 year old female patient who is being seen today via telemedicine for anxiety and difficulty concentrating     Anxiety  Presents for initial visit. Onset was 1 to 6 months ago. The problem has been unchanged. Symptoms include decreased concentration, excessive worry, insomnia and nervous/anxious behavior. Patient reports no depressed mood or suicidal ideas. Symptoms occur most days. The severity of symptoms is interfering with daily activities. The symptoms are aggravated by work stress. The quality of sleep is fair.     There is no history of depression. (Anxiety, has never been diagnosed with  ADHD as a child but exibited inattention and hyperactivity ) Past treatments include lifestyle changes. The treatment provided no relief.        Review of Systems   Constitutional: Positive for fatigue.   Respiratory: Negative.    Cardiovascular: Negative.    Gastrointestinal: Negative.    Musculoskeletal: Negative.    Neurological: Negative.    Psychiatric/Behavioral: Positive for decreased concentration and sleep disturbance. Negative for agitation, behavioral problems, dysphoric mood and suicidal ideas. The patient is nervous/anxious and has insomnia.        Physical Exam  Constitutional:       General: She is not in acute distress.     Appearance: Normal appearance. She is well-developed and well-groomed.   HENT:      Head: Normocephalic.   Pulmonary:      Effort: Pulmonary effort is normal.   Neurological:      Mental Status: She is alert and oriented to person, place, and time.   Psychiatric:         Attention and Perception: Attention normal.         Mood and Affect: Mood normal.         Speech: Speech normal.         Behavior: Behavior is cooperative.         Thought Content: Thought content normal.         Cognition and Memory: Cognition normal.         Judgment: Judgment normal.         ASSESSMENT     Problems Addressed this Visit     None      Visit Diagnoses     Generalized anxiety disorder    -  Primary    Relevant Medications    buPROPion XL (Wellbutrin XL) 150 MG 24 hr tablet    Difficulty concentrating        Insomnia, unspecified type          Diagnoses       Codes Comments    Generalized anxiety disorder    -  Primary ICD-10-CM: F41.1  ICD-9-CM: 300.02     Difficulty concentrating     ICD-10-CM: R41.840  ICD-9-CM: 799.51     Insomnia, unspecified type     ICD-10-CM: G47.00  ICD-9-CM: 780.52           PLAN    Discussed options for treatment with the patient. Discussed referral for testing for ADD but patient would like to wait.  Is agreeable to try Wellbutrin . Will start at 150mg daily can increase  to 300mg after one week if tolerated. Discussed potential side effects. Patient will let me know if she cannot tolerate it.   Continue self care. She can take melatonin or tylenol PM as needed for insomnia  She will let me know how she is doing in 4 weeks,   She is due to schedule CPE  telehealth duration and discussion with patient 20 minutes

## 2020-11-11 ENCOUNTER — TELEPHONE (OUTPATIENT)
Dept: INTERNAL MEDICINE | Facility: CLINIC | Age: 30
End: 2020-11-11

## 2020-11-11 RX ORDER — BUPROPION HYDROCHLORIDE 300 MG/1
300 TABLET ORAL DAILY
Qty: 90 TABLET | Refills: 1 | Status: SHIPPED | OUTPATIENT
Start: 2020-11-11 | End: 2021-02-17 | Stop reason: SDUPTHER

## 2020-11-11 NOTE — TELEPHONE ENCOUNTER
See my chart message. Patient would like to increase wellbutrin . She is currently taking 150mg, will increase to 300mg. New rx sent to pharmacy and patient notified through [facility]

## 2020-12-30 ENCOUNTER — OFFICE VISIT (OUTPATIENT)
Dept: INTERNAL MEDICINE | Facility: CLINIC | Age: 30
End: 2020-12-30

## 2020-12-30 VITALS
HEIGHT: 68 IN | SYSTOLIC BLOOD PRESSURE: 114 MMHG | HEART RATE: 90 BPM | DIASTOLIC BLOOD PRESSURE: 88 MMHG | WEIGHT: 158 LBS | TEMPERATURE: 97.7 F | BODY MASS INDEX: 23.95 KG/M2 | OXYGEN SATURATION: 97 %

## 2020-12-30 DIAGNOSIS — R05.9 COUGH: ICD-10-CM

## 2020-12-30 DIAGNOSIS — U07.1 COVID-19 VIRUS DETECTED: Primary | ICD-10-CM
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
## 2020-12-30 PROBLEM — Z83.79 FAMILY HISTORY OF ULCERATIVE COLITIS: Status: RESOLVED | Noted: 2019-04-17 | Resolved: 2020-12-30

## 2020-12-30 PROBLEM — S71.151A DOG BITE OF RIGHT THIGH: Status: RESOLVED | Noted: 2020-09-04 | Resolved: 2020-12-30

## 2020-12-30 PROCEDURE — 99213 OFFICE O/P EST LOW 20 MIN: CPT | Performed by: NURSE PRACTITIONER

## 2020-12-30 PROCEDURE — 71046 X-RAY EXAM CHEST 2 VIEWS: CPT | Performed by: NURSE PRACTITIONER

## 2020-12-30 RX ORDER — METHYLPREDNISOLONE 4 MG/1
TABLET ORAL
Qty: 21 TABLET | Refills: 0 | Status: SHIPPED | OUTPATIENT
Start: 2020-12-30 | End: 2021-06-21

## 2020-12-30 RX ORDER — ALBUTEROL SULFATE 90 UG/1
2 AEROSOL, METERED RESPIRATORY (INHALATION) EVERY 4 HOURS PRN
Qty: 18 G | Refills: 1 | Status: SHIPPED | OUTPATIENT
Start: 2020-12-30 | End: 2021-06-21

## 2020-12-30 NOTE — PROGRESS NOTES
Subjective   Zora Ashraf is a 30 y.o. female.   Chief Complaint   Patient presents with   • URI   • Cough   • Shortness of Breath   • Neck Pain     Vitals:    12/30/20 1318   BP: 114/88   Pulse: 90   Temp: 97.7 °F (36.5 °C)   SpO2: 97%   Answers for HPI/ROS submitted by the patient on 12/30/2020   What is the primary reason for your visit?: Other  Please describe your symptoms.: Ear pain, congestion, neck pain, difficult breathing with activity  Have you had these symptoms before?: No  How long have you been having these symptoms?: 1-2 weeks  Please list any medications you are currently taking for this condition.: Mucinex dm, sudafed, vitamins  Please describe any probable cause for these symptoms. : Jenelle Blakely is a 30 year old female patient who is here for an acute visit. She was diagnosed with covid on 12/10/20 and continues to have fatigue, soa, cough, and neck pain     Cough  This is a new problem. The current episode started 1 to 4 weeks ago. The cough is non-productive. Associated symptoms include ear pain, a sore throat, shortness of breath and wheezing. Pertinent negatives include no chest pain, fever, hemoptysis, nasal congestion, rhinorrhea or weight loss. Nothing aggravates the symptoms. She has tried OTC cough suppressant, rest and cool air (flonase, vitamins ) for the symptoms. The treatment provided mild relief.   Shortness of Breath  Associated symptoms include ear pain, neck pain, a sore throat and wheezing. Pertinent negatives include no chest pain, fever, hemoptysis or rhinorrhea.   Neck Pain   Pertinent negatives include no chest pain, fever, trouble swallowing or weight loss.        The following portions of the patient's history were reviewed and updated as appropriate: allergies, current medications, past family history, past medical history, past social history, past surgical history and problem list.    Review of Systems   Constitutional: Positive for fatigue. Negative for fever  and weight loss.   HENT: Positive for ear pain and sore throat. Negative for congestion, rhinorrhea and trouble swallowing.    Respiratory: Positive for cough, chest tightness, shortness of breath and wheezing. Negative for hemoptysis.    Cardiovascular: Negative for chest pain.   Musculoskeletal: Positive for neck pain.       Objective   Physical Exam  Vitals signs and nursing note reviewed.   Constitutional:       General: She is not in acute distress.     Appearance: Normal appearance. She is well-developed and well-groomed.   HENT:      Head: Normocephalic.      Right Ear: A middle ear effusion is present.      Left Ear: A middle ear effusion is present.      Nose:      Comments: Mask on      Mouth/Throat:      Pharynx: Oropharynx is clear. Uvula midline.   Eyes:      General: Lids are normal.   Neck:      Musculoskeletal: Full passive range of motion without pain and normal range of motion. Muscular tenderness present. No edema, erythema or neck rigidity.   Lymphadenopathy:      Cervical: Cervical adenopathy present.      Right cervical: Superficial cervical adenopathy present.      Left cervical: Superficial cervical adenopathy present.   Skin:     General: Skin is warm and dry.   Neurological:      Mental Status: She is alert, oriented to person, place, and time and easily aroused.   Psychiatric:         Attention and Perception: Attention normal.         Behavior: Behavior is cooperative.         Assessment/Plan   Diagnoses and all orders for this visit:    1. COVID-19 virus detected (Primary)  -     XR Chest PA & Lateral  -     methylPREDNISolone (MEDROL) 4 MG dose pack; Take as directed on package instructions.  Dispense: 21 tablet; Refill: 0  -     albuterol sulfate  (90 Base) MCG/ACT inhaler; Inhale 2 puffs Every 4 (Four) Hours As Needed for Wheezing.  Dispense: 18 g; Refill: 1    2. Cough  -     XR Chest PA & Lateral  -     methylPREDNISolone (MEDROL) 4 MG dose pack; Take as directed on package  instructions.  Dispense: 21 tablet; Refill: 0  -     albuterol sulfate  (90 Base) MCG/ACT inhaler; Inhale 2 puffs Every 4 (Four) Hours As Needed for Wheezing.  Dispense: 18 g; Refill: 1      Will get a chest xray and call with radiology over-read  Continue flonase  Declined cough suppressant  Rest and drink plenty of fluids  Start medrol  Albuterol as needed   Follow up if symptoms persist, worsen or if new symptoms develop   Go to the ER for worsening SOA, CP or respiratory distress

## 2021-01-04 ENCOUNTER — TELEPHONE (OUTPATIENT)
Dept: INTERNAL MEDICINE | Facility: CLINIC | Age: 31
End: 2021-01-04

## 2021-01-04 NOTE — TELEPHONE ENCOUNTER
Please call the patient and let her know that there was a hazy opacity on the front view of the chest which the radiologist felt was due to breast tissue. He did not see on the lateral view. Otherwise it was negative  I can order a CT scan for further evaluation if she is still having SOA. I would like her to check her o2 sat at home if possible and go to the ER if less than 94%. If any of her symptoms have worsened or are severe. She should go to the ER today for further eval

## 2021-02-17 DIAGNOSIS — E28.2 PCOS (POLYCYSTIC OVARIAN SYNDROME): ICD-10-CM

## 2021-02-17 DIAGNOSIS — L70.0 CYSTIC ACNE: ICD-10-CM

## 2021-02-17 RX ORDER — BUPROPION HYDROCHLORIDE 300 MG/1
300 TABLET ORAL DAILY
Qty: 90 TABLET | Refills: 0 | Status: SHIPPED | OUTPATIENT
Start: 2021-02-17 | End: 2021-05-14

## 2021-02-17 RX ORDER — SPIRONOLACTONE 25 MG/1
25 TABLET ORAL DAILY
Qty: 90 TABLET | Refills: 0 | Status: SHIPPED | OUTPATIENT
Start: 2021-02-17 | End: 2021-05-06 | Stop reason: SDUPTHER

## 2021-03-03 ENCOUNTER — TELEPHONE (OUTPATIENT)
Dept: INTERNAL MEDICINE | Facility: CLINIC | Age: 31
End: 2021-03-03

## 2021-03-03 NOTE — TELEPHONE ENCOUNTER
----- Message from Morena Renae MA sent at 3/2/2021  8:57 AM EST -----  Regarding: FW: Prescription Question  Contact: [phone]    ----- Message -----  From: Zora Ashraf  Sent: 3/1/2021   7:31 PM EST  To: [facility]  Subject: RE: Prescription Question                        Thank you so much. I use it just for fine lines and to help prevent wrinkles. It also helps with my acne.    ----- Message -----  From: JULIO CESAR FENG  Sent: 3/1/21, 8:43 AM  To: Zora Ashraf  Subject: RE: Prescription Question    Jose Fernandeza,   Gonzalo Gallegos:   Yes I can sent this in for her   Please see what she is using it for and I can add it to her problem list   How often is she using the retinol ?       ----- Message -----       From:Zora Ashraf       Sent:2/28/2021  9:17 AM EST         To:GWENDOLYN Chauhan    Subject:Prescription Question    ,   I hope you are well. I am interested in a prescription for tretinoin as a replacement for the current retinol I am using. Is this something you can provide?    Thank you,  Zora Ashraf

## 2021-04-16 ENCOUNTER — BULK ORDERING (OUTPATIENT)
Dept: CASE MANAGEMENT | Facility: OTHER | Age: 31
End: 2021-04-16

## 2021-04-16 DIAGNOSIS — Z23 IMMUNIZATION DUE: ICD-10-CM

## 2021-05-06 DIAGNOSIS — L70.0 CYSTIC ACNE: ICD-10-CM

## 2021-05-06 DIAGNOSIS — E28.2 PCOS (POLYCYSTIC OVARIAN SYNDROME): ICD-10-CM

## 2021-05-06 RX ORDER — SPIRONOLACTONE 25 MG/1
25 TABLET ORAL DAILY
Qty: 90 TABLET | Refills: 0 | Status: SHIPPED | OUTPATIENT
Start: 2021-05-06 | End: 2021-06-11 | Stop reason: SDUPTHER

## 2021-05-14 RX ORDER — BUPROPION HYDROCHLORIDE 300 MG/1
300 TABLET ORAL DAILY
Qty: 90 TABLET | Refills: 0 | Status: SHIPPED | OUTPATIENT
Start: 2021-05-14 | End: 2021-06-21 | Stop reason: ALTCHOICE

## 2021-06-11 DIAGNOSIS — L70.0 CYSTIC ACNE: ICD-10-CM

## 2021-06-11 DIAGNOSIS — E28.2 PCOS (POLYCYSTIC OVARIAN SYNDROME): ICD-10-CM

## 2021-06-14 DIAGNOSIS — E28.2 PCOS (POLYCYSTIC OVARIAN SYNDROME): ICD-10-CM

## 2021-06-14 DIAGNOSIS — L70.0 CYSTIC ACNE: ICD-10-CM

## 2021-06-14 RX ORDER — SPIRONOLACTONE 50 MG/1
75 TABLET, FILM COATED ORAL DAILY
Qty: 45 TABLET | Refills: 1 | Status: SHIPPED | OUTPATIENT
Start: 2021-06-14 | End: 2021-06-14

## 2021-06-14 RX ORDER — SPIRONOLACTONE 50 MG/1
TABLET, FILM COATED ORAL
Qty: 135 TABLET | Refills: 0 | Status: SHIPPED | OUTPATIENT
Start: 2021-06-14 | End: 2021-09-13

## 2021-06-14 NOTE — TELEPHONE ENCOUNTER
Appointment made and lab appointment made this month. Lab appointment scheduled this Thursday can you please put in lab orders.

## 2021-06-16 DIAGNOSIS — Z00.00 ROUTINE HEALTH MAINTENANCE: Primary | ICD-10-CM

## 2021-06-16 DIAGNOSIS — Z11.59 NEED FOR HEPATITIS C SCREENING TEST: ICD-10-CM

## 2021-06-18 LAB
ALBUMIN SERPL-MCNC: 4.9 G/DL (ref 3.5–5.2)
ALBUMIN/GLOB SERPL: 2.6 G/DL
ALP SERPL-CCNC: 36 U/L (ref 39–117)
ALT SERPL-CCNC: 18 U/L (ref 1–33)
APPEARANCE UR: CLEAR
AST SERPL-CCNC: 24 U/L (ref 1–32)
BACTERIA #/AREA URNS HPF: ABNORMAL /HPF
BASOPHILS # BLD AUTO: 0.06 10*3/MM3 (ref 0–0.2)
BASOPHILS NFR BLD AUTO: 0.7 % (ref 0–1.5)
BILIRUB SERPL-MCNC: 0.3 MG/DL (ref 0–1.2)
BILIRUB UR QL STRIP: NEGATIVE
BUN SERPL-MCNC: 15 MG/DL (ref 6–20)
BUN/CREAT SERPL: 14.4 (ref 7–25)
CALCIUM SERPL-MCNC: 9.9 MG/DL (ref 8.6–10.5)
CHLORIDE SERPL-SCNC: 103 MMOL/L (ref 98–107)
CHOLEST SERPL-MCNC: 164 MG/DL (ref 0–200)
CO2 SERPL-SCNC: 24.4 MMOL/L (ref 22–29)
COLOR UR: ABNORMAL
CREAT SERPL-MCNC: 1.04 MG/DL (ref 0.57–1)
EOSINOPHIL # BLD AUTO: 1 10*3/MM3 (ref 0–0.4)
EOSINOPHIL NFR BLD AUTO: 12.2 % (ref 0.3–6.2)
EPI CELLS #/AREA URNS HPF: ABNORMAL /HPF
ERYTHROCYTE [DISTWIDTH] IN BLOOD BY AUTOMATED COUNT: 12 % (ref 12.3–15.4)
GLOBULIN SER CALC-MCNC: 1.9 GM/DL
GLUCOSE SERPL-MCNC: 89 MG/DL (ref 65–99)
GLUCOSE UR QL: NEGATIVE
HCT VFR BLD AUTO: 38.5 % (ref 34–46.6)
HCV AB S/CO SERPL IA: <0.1 S/CO RATIO (ref 0–0.9)
HCV AB SERPL QL IA: NORMAL
HDLC SERPL-MCNC: 66 MG/DL (ref 40–60)
HGB BLD-MCNC: 12.8 G/DL (ref 12–15.9)
HGB UR QL STRIP: NEGATIVE
IMM GRANULOCYTES # BLD AUTO: 0.01 10*3/MM3 (ref 0–0.05)
IMM GRANULOCYTES NFR BLD AUTO: 0.1 % (ref 0–0.5)
KETONES UR QL STRIP: ABNORMAL
LDLC SERPL CALC-MCNC: 83 MG/DL (ref 0–100)
LDLC/HDLC SERPL: 1.25 {RATIO}
LEUKOCYTE ESTERASE UR QL STRIP: NEGATIVE
LYMPHOCYTES # BLD AUTO: 2.24 10*3/MM3 (ref 0.7–3.1)
LYMPHOCYTES NFR BLD AUTO: 27.3 % (ref 19.6–45.3)
MCH RBC QN AUTO: 30.6 PG (ref 26.6–33)
MCHC RBC AUTO-ENTMCNC: 33.2 G/DL (ref 31.5–35.7)
MCV RBC AUTO: 92.1 FL (ref 79–97)
MONOCYTES # BLD AUTO: 0.67 10*3/MM3 (ref 0.1–0.9)
MONOCYTES NFR BLD AUTO: 8.2 % (ref 5–12)
NEUTROPHILS # BLD AUTO: 4.24 10*3/MM3 (ref 1.7–7)
NEUTROPHILS NFR BLD AUTO: 51.5 % (ref 42.7–76)
NITRITE UR QL STRIP: NEGATIVE
NRBC BLD AUTO-RTO: 0 /100 WBC (ref 0–0.2)
PH UR STRIP: 6 [PH] (ref 5–8)
PLATELET # BLD AUTO: 337 10*3/MM3 (ref 140–450)
POTASSIUM SERPL-SCNC: 4.4 MMOL/L (ref 3.5–5.2)
PROT SERPL-MCNC: 6.8 G/DL (ref 6–8.5)
PROT UR QL STRIP: NEGATIVE
RBC # BLD AUTO: 4.18 10*6/MM3 (ref 3.77–5.28)
RBC #/AREA URNS HPF: ABNORMAL /HPF
SODIUM SERPL-SCNC: 139 MMOL/L (ref 136–145)
SP GR UR: 1.03 (ref 1–1.03)
TRIGL SERPL-MCNC: 78 MG/DL (ref 0–150)
TSH SERPL DL<=0.005 MIU/L-ACNC: 2.26 UIU/ML (ref 0.27–4.2)
UROBILINOGEN UR STRIP-MCNC: ABNORMAL MG/DL
VLDLC SERPL CALC-MCNC: 15 MG/DL (ref 5–40)
WBC # BLD AUTO: 8.22 10*3/MM3 (ref 3.4–10.8)
WBC #/AREA URNS HPF: ABNORMAL /HPF

## 2021-06-21 ENCOUNTER — OFFICE VISIT (OUTPATIENT)
Dept: INTERNAL MEDICINE | Facility: CLINIC | Age: 31
End: 2021-06-21

## 2021-06-21 VITALS
SYSTOLIC BLOOD PRESSURE: 124 MMHG | TEMPERATURE: 98 F | RESPIRATION RATE: 16 BRPM | DIASTOLIC BLOOD PRESSURE: 80 MMHG | WEIGHT: 150 LBS | OXYGEN SATURATION: 99 % | HEART RATE: 84 BPM | BODY MASS INDEX: 22.73 KG/M2 | HEIGHT: 68 IN

## 2021-06-21 DIAGNOSIS — F41.1 GENERALIZED ANXIETY DISORDER: ICD-10-CM

## 2021-06-21 DIAGNOSIS — Z00.00 ANNUAL PHYSICAL EXAM: Primary | ICD-10-CM

## 2021-06-21 DIAGNOSIS — L70.0 CYSTIC ACNE: ICD-10-CM

## 2021-06-21 PROCEDURE — 99395 PREV VISIT EST AGE 18-39: CPT | Performed by: NURSE PRACTITIONER

## 2021-06-21 RX ORDER — BUPROPION HYDROCHLORIDE 150 MG/1
150 TABLET ORAL EVERY MORNING
Qty: 90 TABLET | Refills: 0 | Status: SHIPPED | OUTPATIENT
Start: 2021-06-21 | End: 2021-09-20

## 2021-06-21 NOTE — PROGRESS NOTES
Subjective   Zora Ashraf is a 31 y.o. female. Patient is here today for   Chief Complaint   Patient presents with   • Annual Exam          Vitals:    06/21/21 1311   BP: 124/80   Pulse: 84   Resp: 16   Temp: 98 °F (36.7 °C)   SpO2: 99%     Body mass index is 22.81 kg/m².    The following portions of the patient's history were reviewed and updated as appropriate: allergies, current medications, past family history, past medical history, past social history, past surgical history and problem list.    Past Medical History:   Diagnosis Date   • Abdominal pain    • Allergic    • Diarrhea    • Endometriosis    • Family history of ulcerative colitis 4/17/2019   • Lyme disease    • PCOS (polycystic ovarian syndrome)       No Known Allergies   Social History     Socioeconomic History   • Marital status:      Spouse name: Not on file   • Number of children: Not on file   • Years of education: Not on file   • Highest education level: Not on file   Tobacco Use   • Smoking status: Never Smoker   • Smokeless tobacco: Never Used   Substance and Sexual Activity   • Alcohol use: Yes     Alcohol/week: 0.0 standard drinks     Comment: occ    • Drug use: No   • Sexual activity: Not Currently     Partners: Male     Birth control/protection: None        Current Outpatient Medications:   •  buPROPion XL (Wellbutrin XL) 150 MG 24 hr tablet, Take 1 tablet by mouth Every Morning., Disp: 90 tablet, Rfl: 0  •  spironolactone (ALDACTONE) 50 MG tablet, TAKE 1 AND 1/2 TABLETS BY MOUTH DAILY, Disp: 135 tablet, Rfl: 0  •  Tretinoin, Facial Wrinkles, 0.05 % cream, Apply 1 application topically Daily., Disp: 20 g, Rfl: 5       Objective   History of Present Illness   Zora Ashraf 31 y.o. female who presents for an Annual Wellness Visit.  she has a history of   Patient Active Problem List   Diagnosis   • Functional diarrhea   • PCOS (polycystic ovarian syndrome)   • Arthralgia   • Cystic acne   .  she has been doing well with no  interval problems. Her anxiety has improved and she would like to taper off of bupropion . She takes spironolactone for cystic acne.     Labs results discussed in detail with the patient.  Plan to update vaccines if needed today.    Health Habits:  Dental Exam. up to date  Eye Exam. not up to date - .  Exercise: 6 times/week.  Current exercise activities include: HIIT  Diet is overall healthy  Has gyn     Lab Results (most recent)     Orders Only on 06/16/2021   Component Date Value Ref Range Status   • WBC 06/17/2021 8.22  3.40 - 10.80 10*3/mm3 Final   • RBC 06/17/2021 4.18  3.77 - 5.28 10*6/mm3 Final   • Hemoglobin 06/17/2021 12.8  12.0 - 15.9 g/dL Final   • Hematocrit 06/17/2021 38.5  34.0 - 46.6 % Final   • MCV 06/17/2021 92.1  79.0 - 97.0 fL Final   • MCH 06/17/2021 30.6  26.6 - 33.0 pg Final   • MCHC 06/17/2021 33.2  31.5 - 35.7 g/dL Final   • RDW 06/17/2021 12.0* 12.3 - 15.4 % Final   • Platelets 06/17/2021 337  140 - 450 10*3/mm3 Final   • Neutrophil Rel % 06/17/2021 51.5  42.7 - 76.0 % Final   • Lymphocyte Rel % 06/17/2021 27.3  19.6 - 45.3 % Final   • Monocyte Rel % 06/17/2021 8.2  5.0 - 12.0 % Final   • Eosinophil Rel % 06/17/2021 12.2* 0.3 - 6.2 % Final   • Basophil Rel % 06/17/2021 0.7  0.0 - 1.5 % Final   • Neutrophils Absolute 06/17/2021 4.24  1.70 - 7.00 10*3/mm3 Final   • Lymphocytes Absolute 06/17/2021 2.24  0.70 - 3.10 10*3/mm3 Final   • Monocytes Absolute 06/17/2021 0.67  0.10 - 0.90 10*3/mm3 Final   • Eosinophils Absolute 06/17/2021 1.00* 0.00 - 0.40 10*3/mm3 Final   • Basophils Absolute 06/17/2021 0.06  0.00 - 0.20 10*3/mm3 Final   • Immature Granulocyte Rel % 06/17/2021 0.1  0.0 - 0.5 % Final   • Immature Grans Absolute 06/17/2021 0.01  0.00 - 0.05 10*3/mm3 Final   • nRBC 06/17/2021 0.0  0.0 - 0.2 /100 WBC Final   • Glucose 06/17/2021 89  65 - 99 mg/dL Final   • BUN 06/17/2021 15  6 - 20 mg/dL Final   • Creatinine 06/17/2021 1.04* 0.57 - 1.00 mg/dL Final   • eGFR Non  Am 06/17/2021 61   >60 mL/min/1.73 Final    Comment: GFR Normal >60  Chronic Kidney Disease <60  Kidney Failure <15     • eGFR  Am 06/17/2021 74  >60 mL/min/1.73 Final   • BUN/Creatinine Ratio 06/17/2021 14.4  7.0 - 25.0 Final   • Sodium 06/17/2021 139  136 - 145 mmol/L Final   • Potassium 06/17/2021 4.4  3.5 - 5.2 mmol/L Final   • Chloride 06/17/2021 103  98 - 107 mmol/L Final   • Total CO2 06/17/2021 24.4  22.0 - 29.0 mmol/L Final   • Calcium 06/17/2021 9.9  8.6 - 10.5 mg/dL Final   • Total Protein 06/17/2021 6.8  6.0 - 8.5 g/dL Final   • Albumin 06/17/2021 4.90  3.50 - 5.20 g/dL Final   • Globulin 06/17/2021 1.9  gm/dL Final   • A/G Ratio 06/17/2021 2.6  g/dL Final   • Total Bilirubin 06/17/2021 0.3  0.0 - 1.2 mg/dL Final   • Alkaline Phosphatase 06/17/2021 36* 39 - 117 U/L Final   • AST (SGOT) 06/17/2021 24  1 - 32 U/L Final   • ALT (SGPT) 06/17/2021 18  1 - 33 U/L Final   • Hepatitis C Ab 06/17/2021 <0.1  0.0 - 0.9 s/co ratio Final   • Total Cholesterol 06/17/2021 164  0 - 200 mg/dL Final    Comment: Cholesterol Reference Ranges  (U.S. Department of Health and Human Services ATP III  Classifications)  Desirable          <200 mg/dL  Borderline High    200-239 mg/dL  High Risk          >240 mg/dL  Triglyceride Reference Ranges  (U.S. Department of Health and Human Services ATP III  Classifications)  Normal           <150 mg/dL  Borderline High  150-199 mg/dL  High             200-499 mg/dL  Very High        >500 mg/dL  HDL Reference Ranges  (U.S. Department of Health and Human Services ATP III  Classifcations)  Low     <40 mg/dl (major risk factor for CHD)  High    >60 mg/dl ('negative' risk factor for CHD)  LDL Reference Ranges  (U.S. Department of Health and Human Services ATP III  Classifcations)  Optimal          <100 mg/dL  Near Optimal     100-129 mg/dL  Borderline High  130-159 mg/dL  High             160-189 mg/dL  Very High        >189 mg/dL     • Triglycerides 06/17/2021 78  0 - 150 mg/dL Final   • HDL Cholesterol  06/17/2021 66* 40 - 60 mg/dL Final   • VLDL Cholesterol Miki 06/17/2021 15  5 - 40 mg/dL Final   • LDL Chol Calc ([facility]) 06/17/2021 83  0 - 100 mg/dL Final   • LDL/HDL RATIO 06/17/2021 1.25   Final   • TSH 06/17/2021 2.260  0.270 - 4.200 uIU/mL Final   • Specific Gravity, UA 06/17/2021 1.029  1.005 - 1.030 Final   • pH, UA 06/17/2021 6.0  5.0 - 8.0 Final   • Color, UA 06/17/2021 See below:*  Final    Comment: Dark Yellow  REFERENCE RANGE: Yellow, Straw     • Appearance, UA 06/17/2021 Clear  Clear Final   • Leukocytes, UA 06/17/2021 Negative  Negative Final   • Protein 06/17/2021 Negative  Negative Final   • Glucose, UA 06/17/2021 Negative  Negative Final   • Ketones 06/17/2021 Trace* Negative Final   • Blood, UA 06/17/2021 Negative  Negative Final   • Bilirubin, UA 06/17/2021 Negative  Negative Final   • Urobilinogen, UA 06/17/2021 Comment   Final    Comment: 0.2 E.U./dL  REFERENCE RANGE: 0.2 - 1.0 E.U./dL     • Nitrite, UA 06/17/2021 Negative  Negative Final   • WBC, UA 06/17/2021 Comment  /HPF Final    Comment: None Seen  REFERENCE RANGE: None Seen, 0-2     • RBC, UA 06/17/2021 Comment  /HPF Final    Comment: None Seen  REFERENCE RANGE: None Seen, 0-2     • Epithelial Cells (non renal) 06/17/2021 7-12* /HPF Final    REFERENCE RANGE: None Seen, 0-2   • Bacteria, UA 06/17/2021 2+* None Seen /HPF Final   • Interpretation 06/17/2021 Comment   Final    Comment: Negative  Not infected with HCV, unless recent infection is suspected or other  evidence exists to indicate HCV infection.                   Review of Systems   Constitutional: Positive for diaphoresis (at night ). Negative for fatigue.   HENT: Negative.    Eyes: Negative for visual disturbance.   Respiratory: Negative for cough, chest tightness and shortness of breath.    Cardiovascular: Negative.    Gastrointestinal: Positive for constipation (occasional ). Negative for abdominal pain, blood in stool and diarrhea.   Genitourinary: Positive for frequency. Negative  for difficulty urinating and pelvic pain (h/o PCOS ).   Musculoskeletal: Negative.    Allergic/Immunologic: Negative for environmental allergies.   Neurological: Negative for dizziness and light-headedness.   Psychiatric/Behavioral: Negative for dysphoric mood and sleep disturbance. The patient is not nervous/anxious.        Physical Exam  Vitals and nursing note reviewed.   Constitutional:       General: She is not in acute distress.     Appearance: Normal appearance. She is well-developed and well-groomed.   HENT:      Head: Normocephalic.      Right Ear: Tympanic membrane, ear canal and external ear normal.      Left Ear: Tympanic membrane, ear canal and external ear normal.      Nose: Nose normal.      Mouth/Throat:      Lips: Pink.      Pharynx: Oropharynx is clear.   Eyes:      General: Lids are normal.      Conjunctiva/sclera: Conjunctivae normal.      Pupils: Pupils are equal, round, and reactive to light.   Neck:      Thyroid: No thyromegaly.   Cardiovascular:      Rate and Rhythm: Normal rate and regular rhythm.      Heart sounds: Normal heart sounds.   Pulmonary:      Effort: Pulmonary effort is normal.      Breath sounds: Normal breath sounds.   Musculoskeletal:      Cervical back: Neck supple.   Skin:     General: Skin is warm and dry.   Neurological:      Mental Status: She is alert and oriented to person, place, and time.   Psychiatric:         Mood and Affect: Mood normal.         ASSESSMENT       Problems Addressed this Visit     Cystic acne      Other Visit Diagnoses     Annual physical exam    -  Primary    Generalized anxiety disorder        Relevant Medications    buPROPion XL (Wellbutrin XL) 150 MG 24 hr tablet      Diagnoses       Codes Comments    Annual physical exam    -  Primary ICD-10-CM: Z00.00  ICD-9-CM: V70.0     Generalized anxiety disorder     ICD-10-CM: F41.1  ICD-9-CM: 300.02     Cystic acne     ICD-10-CM: L70.0  ICD-9-CM: 706.1           PLAN    BP and BMI are normal   Overall  labs look good, pts eosinophils are elevated , she denies any allergy symptoms, creatinine is 1.04  Recommend she stay hydrated  Will recheck cbc and cmp in 8-12 weeks  Continue with diet and exercise  Declines covid 19 vaccine at this time   Return in about 1 year (around 6/21/2022) for Annual physical, with labs.

## 2021-08-02 DIAGNOSIS — L70.0 CYSTIC ACNE: ICD-10-CM

## 2021-08-02 DIAGNOSIS — E28.2 PCOS (POLYCYSTIC OVARIAN SYNDROME): ICD-10-CM

## 2021-08-02 RX ORDER — SPIRONOLACTONE 25 MG/1
25 TABLET ORAL DAILY
Qty: 90 TABLET | Refills: 0 | Status: SHIPPED | OUTPATIENT
Start: 2021-08-02 | End: 2021-11-09

## 2021-08-13 RX ORDER — TRETINOIN 1 MG/G
CREAM TOPICAL NIGHTLY
Qty: 45 G | Refills: 1 | Status: SHIPPED | OUTPATIENT
Start: 2021-08-13

## 2021-08-16 RX ORDER — BUPROPION HYDROCHLORIDE 300 MG/1
300 TABLET ORAL DAILY
Qty: 90 TABLET | Refills: 0 | OUTPATIENT
Start: 2021-08-16

## 2021-09-11 DIAGNOSIS — E28.2 PCOS (POLYCYSTIC OVARIAN SYNDROME): ICD-10-CM

## 2021-09-11 DIAGNOSIS — L70.0 CYSTIC ACNE: ICD-10-CM

## 2021-09-13 RX ORDER — SPIRONOLACTONE 50 MG/1
TABLET, FILM COATED ORAL
Qty: 135 TABLET | Refills: 0 | Status: SHIPPED | OUTPATIENT
Start: 2021-09-13 | End: 2021-12-20

## 2021-09-20 RX ORDER — BUPROPION HYDROCHLORIDE 150 MG/1
150 TABLET ORAL EVERY MORNING
Qty: 90 TABLET | Refills: 0 | Status: SHIPPED | OUTPATIENT
Start: 2021-09-20 | End: 2022-02-22

## 2021-11-09 DIAGNOSIS — L70.0 CYSTIC ACNE: ICD-10-CM

## 2021-11-09 DIAGNOSIS — E28.2 PCOS (POLYCYSTIC OVARIAN SYNDROME): ICD-10-CM

## 2021-11-09 RX ORDER — SPIRONOLACTONE 25 MG/1
25 TABLET ORAL DAILY
Qty: 90 TABLET | Refills: 0 | Status: SHIPPED | OUTPATIENT
Start: 2021-11-09

## 2021-12-20 DIAGNOSIS — L70.0 CYSTIC ACNE: ICD-10-CM

## 2021-12-20 DIAGNOSIS — E28.2 PCOS (POLYCYSTIC OVARIAN SYNDROME): ICD-10-CM

## 2021-12-20 RX ORDER — SPIRONOLACTONE 50 MG/1
TABLET, FILM COATED ORAL
Qty: 135 TABLET | Refills: 0 | Status: SHIPPED | OUTPATIENT
Start: 2021-12-20 | End: 2022-03-01 | Stop reason: SDUPTHER

## 2022-02-22 ENCOUNTER — PRE-ADMISSION TESTING (OUTPATIENT)
Dept: PREADMISSION TESTING | Facility: HOSPITAL | Age: 32
End: 2022-02-22

## 2022-02-22 VITALS
OXYGEN SATURATION: 99 % | TEMPERATURE: 98 F | SYSTOLIC BLOOD PRESSURE: 135 MMHG | HEART RATE: 68 BPM | DIASTOLIC BLOOD PRESSURE: 78 MMHG | WEIGHT: 152.7 LBS | RESPIRATION RATE: 18 BRPM | HEIGHT: 68 IN | BODY MASS INDEX: 23.14 KG/M2

## 2022-02-22 LAB
ANION GAP SERPL CALCULATED.3IONS-SCNC: 10 MMOL/L (ref 5–15)
BUN SERPL-MCNC: 19 MG/DL (ref 6–20)
BUN/CREAT SERPL: 25.7 (ref 7–25)
CALCIUM SPEC-SCNC: 9.1 MG/DL (ref 8.6–10.5)
CHLORIDE SERPL-SCNC: 103 MMOL/L (ref 98–107)
CO2 SERPL-SCNC: 25 MMOL/L (ref 22–29)
CREAT SERPL-MCNC: 0.74 MG/DL (ref 0.57–1)
DEPRECATED RDW RBC AUTO: 37.3 FL (ref 37–54)
ERYTHROCYTE [DISTWIDTH] IN BLOOD BY AUTOMATED COUNT: 11.7 % (ref 12.3–15.4)
GFR SERPL CREATININE-BSD FRML MDRD: 90 ML/MIN/1.73
GLUCOSE SERPL-MCNC: 92 MG/DL (ref 65–99)
HCG SERPL QL: NEGATIVE
HCT VFR BLD AUTO: 37.1 % (ref 34–46.6)
HGB BLD-MCNC: 12.6 G/DL (ref 12–15.9)
MCH RBC QN AUTO: 30.5 PG (ref 26.6–33)
MCHC RBC AUTO-ENTMCNC: 34 G/DL (ref 31.5–35.7)
MCV RBC AUTO: 89.8 FL (ref 79–97)
PLATELET # BLD AUTO: 306 10*3/MM3 (ref 140–450)
PMV BLD AUTO: 10.2 FL (ref 6–12)
POTASSIUM SERPL-SCNC: 4.2 MMOL/L (ref 3.5–5.2)
RBC # BLD AUTO: 4.13 10*6/MM3 (ref 3.77–5.28)
SODIUM SERPL-SCNC: 138 MMOL/L (ref 136–145)
WBC NRBC COR # BLD: 8.1 10*3/MM3 (ref 3.4–10.8)

## 2022-02-22 PROCEDURE — 80048 BASIC METABOLIC PNL TOTAL CA: CPT

## 2022-02-22 PROCEDURE — 85027 COMPLETE CBC AUTOMATED: CPT

## 2022-02-22 PROCEDURE — 84703 CHORIONIC GONADOTROPIN ASSAY: CPT

## 2022-02-22 PROCEDURE — 36415 COLL VENOUS BLD VENIPUNCTURE: CPT

## 2022-02-22 RX ORDER — DIPHENOXYLATE HYDROCHLORIDE AND ATROPINE SULFATE 2.5; .025 MG/1; MG/1
1 TABLET ORAL DAILY
COMMUNITY

## 2022-02-22 RX ORDER — ERGOCALCIFEROL (VITAMIN D2) 10 MCG
400 TABLET ORAL DAILY
COMMUNITY

## 2022-02-22 NOTE — DISCHARGE INSTRUCTIONS
Take the following medications the morning of surgery:  NONE    ARRIVAL TIME FOR SURGERY IS 11:00 AM      If you are on prescription narcotic pain medication to control your pain you may also take that medication the morning of surgery.    General Instructions:  • Do not eat solid food after midnight the night before surgery.  • You may drink clear liquids day of surgery but must stop at least one hour before your hospital arrival time.  • It is beneficial for you to have a clear drink that contains carbohydrates the day of surgery.  We suggest a 12 to 20 ounce bottle of Gatorade or Powerade for non-diabetic patients or a 12 to 20 ounce bottle of G2 or Powerade Zero for diabetic patients. (Pediatric patients, are not advised to drink a 12 to 20 ounce carbohydrate drink)    Clear liquids are liquids you can see through.  Nothing red in color.     Plain water                               Sports drinks  Sodas                                   Gelatin (Jell-O)  Fruit juices without pulp such as white grape juice and apple juice  Popsicles that contain no fruit or yogurt  Tea or coffee (no cream or milk added)  Gatorade / Powerade  G2 / Powerade Zero    • Infants may have breast milk up to four hours before surgery.  • Infants drinking formula may drink formula up to six hours before surgery.   • Patients who avoid smoking, chewing tobacco and alcohol for 4 weeks prior to surgery have a reduced risk of post-operative complications.  Quit smoking as many days before surgery as you can.  • Do not smoke, use chewing tobacco or drink alcohol the day of surgery.   • If applicable bring your C-PAP/ BI-PAP machine.  • Bring any papers given to you in the doctor’s office.  • Wear clean comfortable clothes.  • Do not wear contact lenses, false eyelashes or make-up.  Bring a case for your glasses.   • Bring crutches or walker if applicable.  • Remove all piercings.  Leave jewelry and any other valuables at home.  • Hair  extensions with metal clips must be removed prior to surgery.  • The Pre-Admission Testing nurse will instruct you to bring medications if unable to obtain an accurate list in Pre-Admission Testing.        If you were given a blood bank ID arm band remember to bring it with you the day of surgery.    Preventing a Surgical Site Infection:  • For 2 to 3 days before surgery, avoid shaving with a razor because the razor can irritate skin and make it easier to develop an infection.    • Any areas of open skin can increase the risk of a post-operative wound infection by allowing bacteria to enter and travel throughout the body.  Notify your surgeon if you have any skin wounds / rashes even if it is not near the expected surgical site.  The area will need assessed to determine if surgery should be delayed until it is healed.  • The night prior to surgery shower using a fresh bar of anti-bacterial soap (such as Dial) and clean washcloth.  Sleep in a clean bed with clean clothing.  Do not allow pets to sleep with you.  • Shower on the morning of surgery using a fresh bar of anti-bacterial soap (such as Dial) and clean washcloth.  Dry with a clean towel and dress in clean clothing.  • Ask your surgeon if you will be receiving antibiotics prior to surgery.  • Make sure you, your family, and all healthcare providers clean their hands with soap and water or an alcohol based hand  before caring for you or your wound.    Day of surgery:  Your arrival time is approximately two hours before your scheduled surgery time.  Upon arrival, a Pre-op nurse and Anesthesiologist will review your health history, obtain vital signs, and answer questions you may have.  The only belongings needed at this time will be a list of your home medications and if applicable your C-PAP/BI-PAP machine.  A Pre-op nurse will start an IV and you may receive medication in preparation for surgery, including something to help you relax.     Please be  aware that surgery does come with discomfort.  We want to make every effort to control your discomfort so please discuss any uncontrolled symptoms with your nurse.   Your doctor will most likely have prescribed pain medications.      If you are going home after surgery you will receive individualized written care instructions before being discharged.  A responsible adult must drive you to and from the hospital on the day of your surgery and stay with you for 24 hours.  Discharge prescriptions can be filled by the hospital pharmacy during regular pharmacy hours.  If you are having surgery late in the day/evening your prescription may be e-prescribed to your pharmacy.  Please verify your pharmacy hours or chose a 24 hour pharmacy to avoid not having access to your prescription because your pharmacy has closed for the day.    If you are staying overnight following surgery, you will be transported to your hospital room following the recovery period.  [facility] has all private rooms.    If you have any questions please call Pre-Admission Testing at [phone].  Deductibles and co-payments are collected on the day of service. Please be prepared to pay the required co-pay, deductible or deposit on the day of service as defined by your plan.    Patient Education for Self-Quarantine Process    • Following your COVID testing, we strongly recommend that you wear a mask when you are with other people and practice social distancing.   • Limit your activities to only required outings.  • Wash your hands with soap and water frequently for at least 20 seconds.   • Avoid touching your eyes, nose and mouth with unwashed hands.  • Do not share anything - utensils, drinking glasses, food from the same bowl.   • Sanitize household surfaces daily. Include all high touch areas (door handles, light switches, phones, countertops, etc.)    Call your surgeon immediately if you experience any of the following  symptoms:  • Sore Throat  • Shortness of Breath or difficulty breathing  • Cough  • Chills  • Body soreness or muscle pain  • Headache  • Fever  • New loss of taste or smell  • Do not arrive for your surgery ill.  Your procedure will need to be rescheduled to another time.  You will need to call your physician before the day of surgery to avoid any unnecessary exposure to hospital staff as well as other patients.

## 2022-02-24 RX ORDER — ACETAMINOPHEN 500 MG
1000 TABLET ORAL
Status: COMPLETED | OUTPATIENT
Start: 2022-02-24 | End: 2022-02-28

## 2022-02-25 ENCOUNTER — LAB (OUTPATIENT)
Dept: LAB | Facility: HOSPITAL | Age: 32
End: 2022-02-25

## 2022-02-25 LAB — SARS-COV-2 ORF1AB RESP QL NAA+PROBE: NOT DETECTED

## 2022-02-25 PROCEDURE — C9803 HOPD COVID-19 SPEC COLLECT: HCPCS

## 2022-02-25 PROCEDURE — U0004 COV-19 TEST NON-CDC HGH THRU: HCPCS

## 2022-02-28 ENCOUNTER — HOSPITAL ENCOUNTER (OUTPATIENT)
Facility: HOSPITAL | Age: 32
Setting detail: HOSPITAL OUTPATIENT SURGERY
Discharge: HOME | End: 2022-02-28
Attending: OBSTETRICS & GYNECOLOGY | Admitting: OBSTETRICS & GYNECOLOGY

## 2022-02-28 ENCOUNTER — ANESTHESIA EVENT (OUTPATIENT)
Dept: PERIOP | Facility: HOSPITAL | Age: 32
End: 2022-02-28

## 2022-02-28 ENCOUNTER — ANESTHESIA (OUTPATIENT)
Dept: PERIOP | Facility: HOSPITAL | Age: 32
End: 2022-02-28

## 2022-02-28 VITALS
HEART RATE: 82 BPM | SYSTOLIC BLOOD PRESSURE: 118 MMHG | TEMPERATURE: 98.4 F | OXYGEN SATURATION: 100 % | DIASTOLIC BLOOD PRESSURE: 80 MMHG | RESPIRATION RATE: 18 BRPM

## 2022-02-28 DIAGNOSIS — N80.9 ENDOMETRIOSIS: ICD-10-CM

## 2022-02-28 DIAGNOSIS — N92.6 IRREGULAR MENSTRUAL CYCLE: ICD-10-CM

## 2022-02-28 DIAGNOSIS — G89.18 POST-OP PAIN: Primary | ICD-10-CM

## 2022-02-28 DIAGNOSIS — R10.2 PELVIC PAIN: ICD-10-CM

## 2022-02-28 PROCEDURE — C1782 MORCELLATOR: HCPCS | Performed by: OBSTETRICS & GYNECOLOGY

## 2022-02-28 PROCEDURE — 25010000002 PROPOFOL 10 MG/ML EMULSION: Performed by: NURSE ANESTHETIST, CERTIFIED REGISTERED

## 2022-02-28 PROCEDURE — 25010000002 ONDANSETRON PER 1 MG: Performed by: NURSE ANESTHETIST, CERTIFIED REGISTERED

## 2022-02-28 PROCEDURE — 25010000002 KETOROLAC TROMETHAMINE PER 15 MG: Performed by: NURSE ANESTHETIST, CERTIFIED REGISTERED

## 2022-02-28 PROCEDURE — 25010000002 FENTANYL CITRATE (PF) 50 MCG/ML SOLUTION: Performed by: NURSE ANESTHETIST, CERTIFIED REGISTERED

## 2022-02-28 PROCEDURE — C1889 IMPLANT/INSERT DEVICE, NOC: HCPCS | Performed by: OBSTETRICS & GYNECOLOGY

## 2022-02-28 PROCEDURE — 25010000002 NEOSTIGMINE 5 MG/10ML SOLUTION: Performed by: NURSE ANESTHETIST, CERTIFIED REGISTERED

## 2022-02-28 PROCEDURE — 25010000002 FENTANYL CITRATE (PF) 50 MCG/ML SOLUTION: Performed by: ANESTHESIOLOGY

## 2022-02-28 PROCEDURE — 25010000002 DEXAMETHASONE PER 1 MG: Performed by: NURSE ANESTHETIST, CERTIFIED REGISTERED

## 2022-02-28 PROCEDURE — 25010000002 ONDANSETRON PER 1 MG: Performed by: ANESTHESIOLOGY

## 2022-02-28 PROCEDURE — C1765 ADHESION BARRIER: HCPCS | Performed by: OBSTETRICS & GYNECOLOGY

## 2022-02-28 PROCEDURE — 25010000002 ROPIVACAINE PER 1 MG: Performed by: OBSTETRICS & GYNECOLOGY

## 2022-02-28 PROCEDURE — 88305 TISSUE EXAM BY PATHOLOGIST: CPT | Performed by: OBSTETRICS & GYNECOLOGY

## 2022-02-28 DEVICE — FLOSEAL HEMOSTATIC MATRIX, 5ML
Type: IMPLANTABLE DEVICE | Site: ABDOMEN | Status: FUNCTIONAL
Brand: FLOSEAL HEMOSTATIC MATRIX

## 2022-02-28 DEVICE — ABSORBABLE ADHESION BARRIER
Type: IMPLANTABLE DEVICE | Site: ABDOMEN | Status: FUNCTIONAL
Brand: GYNECARE INTERCEED

## 2022-02-28 RX ORDER — LIDOCAINE HYDROCHLORIDE AND EPINEPHRINE 10; 10 MG/ML; UG/ML
INJECTION, SOLUTION INFILTRATION; PERINEURAL AS NEEDED
Status: DISCONTINUED | OUTPATIENT
Start: 2022-02-28 | End: 2022-02-28 | Stop reason: HOSPADM

## 2022-02-28 RX ORDER — NALBUPHINE HCL 10 MG/ML
10 AMPUL (ML) INJECTION EVERY 4 HOURS PRN
Status: DISCONTINUED | OUTPATIENT
Start: 2022-02-28 | End: 2022-02-28 | Stop reason: HOSPADM

## 2022-02-28 RX ORDER — KETOROLAC TROMETHAMINE 30 MG/ML
INJECTION, SOLUTION INTRAMUSCULAR; INTRAVENOUS AS NEEDED
Status: DISCONTINUED | OUTPATIENT
Start: 2022-02-28 | End: 2022-02-28 | Stop reason: SURG

## 2022-02-28 RX ORDER — ROCURONIUM BROMIDE 10 MG/ML
INJECTION, SOLUTION INTRAVENOUS AS NEEDED
Status: DISCONTINUED | OUTPATIENT
Start: 2022-02-28 | End: 2022-02-28 | Stop reason: SURG

## 2022-02-28 RX ORDER — NALOXONE HCL 0.4 MG/ML
0.4 VIAL (ML) INJECTION AS NEEDED
Status: DISCONTINUED | OUTPATIENT
Start: 2022-02-28 | End: 2022-02-28 | Stop reason: HOSPADM

## 2022-02-28 RX ORDER — DEXAMETHASONE SODIUM PHOSPHATE 10 MG/ML
INJECTION INTRAMUSCULAR; INTRAVENOUS AS NEEDED
Status: DISCONTINUED | OUTPATIENT
Start: 2022-02-28 | End: 2022-02-28 | Stop reason: SURG

## 2022-02-28 RX ORDER — PROMETHAZINE HYDROCHLORIDE 25 MG/1
25 TABLET ORAL ONCE AS NEEDED
Status: DISCONTINUED | OUTPATIENT
Start: 2022-02-28 | End: 2022-02-28 | Stop reason: HOSPADM

## 2022-02-28 RX ORDER — ROPIVACAINE HYDROCHLORIDE 5 MG/ML
INJECTION, SOLUTION EPIDURAL; INFILTRATION; PERINEURAL AS NEEDED
Status: DISCONTINUED | OUTPATIENT
Start: 2022-02-28 | End: 2022-02-28 | Stop reason: HOSPADM

## 2022-02-28 RX ORDER — NALBUPHINE HCL 10 MG/ML
2 AMPUL (ML) INJECTION EVERY 4 HOURS PRN
Status: DISCONTINUED | OUTPATIENT
Start: 2022-02-28 | End: 2022-02-28 | Stop reason: HOSPADM

## 2022-02-28 RX ORDER — ONDANSETRON 2 MG/ML
INJECTION INTRAMUSCULAR; INTRAVENOUS AS NEEDED
Status: DISCONTINUED | OUTPATIENT
Start: 2022-02-28 | End: 2022-02-28 | Stop reason: SURG

## 2022-02-28 RX ORDER — SODIUM CHLORIDE 9 MG/ML
INJECTION, SOLUTION INTRAVENOUS AS NEEDED
Status: DISCONTINUED | OUTPATIENT
Start: 2022-02-28 | End: 2022-02-28 | Stop reason: HOSPADM

## 2022-02-28 RX ORDER — SODIUM CHLORIDE 0.9 % (FLUSH) 0.9 %
10 SYRINGE (ML) INJECTION AS NEEDED
Status: DISCONTINUED | OUTPATIENT
Start: 2022-02-28 | End: 2022-02-28 | Stop reason: HOSPADM

## 2022-02-28 RX ORDER — FENTANYL CITRATE 50 UG/ML
INJECTION, SOLUTION INTRAMUSCULAR; INTRAVENOUS AS NEEDED
Status: DISCONTINUED | OUTPATIENT
Start: 2022-02-28 | End: 2022-02-28 | Stop reason: SURG

## 2022-02-28 RX ORDER — FENTANYL CITRATE 50 UG/ML
50 INJECTION, SOLUTION INTRAMUSCULAR; INTRAVENOUS
Status: DISCONTINUED | OUTPATIENT
Start: 2022-02-28 | End: 2022-02-28 | Stop reason: HOSPADM

## 2022-02-28 RX ORDER — SODIUM CHLORIDE 0.9 % (FLUSH) 0.9 %
10 SYRINGE (ML) INJECTION EVERY 12 HOURS SCHEDULED
Status: DISCONTINUED | OUTPATIENT
Start: 2022-02-28 | End: 2022-02-28 | Stop reason: HOSPADM

## 2022-02-28 RX ORDER — MIDAZOLAM HYDROCHLORIDE 1 MG/ML
1 INJECTION INTRAMUSCULAR; INTRAVENOUS
Status: DISCONTINUED | OUTPATIENT
Start: 2022-02-28 | End: 2022-02-28 | Stop reason: HOSPADM

## 2022-02-28 RX ORDER — DIPHENHYDRAMINE HYDROCHLORIDE 50 MG/ML
12.5 INJECTION INTRAMUSCULAR; INTRAVENOUS
Status: DISCONTINUED | OUTPATIENT
Start: 2022-02-28 | End: 2022-02-28 | Stop reason: HOSPADM

## 2022-02-28 RX ORDER — NEOSTIGMINE METHYLSULFATE 0.5 MG/ML
INJECTION, SOLUTION INTRAVENOUS AS NEEDED
Status: DISCONTINUED | OUTPATIENT
Start: 2022-02-28 | End: 2022-02-28 | Stop reason: SURG

## 2022-02-28 RX ORDER — GLYCOPYRROLATE 0.2 MG/ML
INJECTION INTRAMUSCULAR; INTRAVENOUS AS NEEDED
Status: DISCONTINUED | OUTPATIENT
Start: 2022-02-28 | End: 2022-02-28 | Stop reason: SURG

## 2022-02-28 RX ORDER — SODIUM CHLORIDE, SODIUM LACTATE, POTASSIUM CHLORIDE, CALCIUM CHLORIDE 600; 310; 30; 20 MG/100ML; MG/100ML; MG/100ML; MG/100ML
9 INJECTION, SOLUTION INTRAVENOUS CONTINUOUS
Status: DISCONTINUED | OUTPATIENT
Start: 2022-02-28 | End: 2022-02-28 | Stop reason: HOSPADM

## 2022-02-28 RX ORDER — ONDANSETRON 2 MG/ML
4 INJECTION INTRAMUSCULAR; INTRAVENOUS ONCE AS NEEDED
Status: COMPLETED | OUTPATIENT
Start: 2022-02-28 | End: 2022-02-28

## 2022-02-28 RX ORDER — HYDRALAZINE HYDROCHLORIDE 20 MG/ML
5 INJECTION INTRAMUSCULAR; INTRAVENOUS
Status: DISCONTINUED | OUTPATIENT
Start: 2022-02-28 | End: 2022-02-28 | Stop reason: HOSPADM

## 2022-02-28 RX ORDER — PROPOFOL 10 MG/ML
VIAL (ML) INTRAVENOUS AS NEEDED
Status: DISCONTINUED | OUTPATIENT
Start: 2022-02-28 | End: 2022-02-28 | Stop reason: SURG

## 2022-02-28 RX ORDER — LIDOCAINE HYDROCHLORIDE 20 MG/ML
INJECTION, SOLUTION INFILTRATION; PERINEURAL AS NEEDED
Status: DISCONTINUED | OUTPATIENT
Start: 2022-02-28 | End: 2022-02-28 | Stop reason: SURG

## 2022-02-28 RX ORDER — OXYCODONE HYDROCHLORIDE 5 MG/1
5 TABLET ORAL EVERY 4 HOURS PRN
Qty: 15 TABLET | Refills: 0 | Status: SHIPPED | OUTPATIENT
Start: 2022-02-28

## 2022-02-28 RX ORDER — HYDROCODONE BITARTRATE AND ACETAMINOPHEN 5; 325 MG/1; MG/1
1 TABLET ORAL ONCE AS NEEDED
Status: COMPLETED | OUTPATIENT
Start: 2022-02-28 | End: 2022-02-28

## 2022-02-28 RX ORDER — HYDROMORPHONE HYDROCHLORIDE 1 MG/ML
0.25 INJECTION, SOLUTION INTRAMUSCULAR; INTRAVENOUS; SUBCUTANEOUS
Status: DISCONTINUED | OUTPATIENT
Start: 2022-02-28 | End: 2022-02-28 | Stop reason: HOSPADM

## 2022-02-28 RX ORDER — LIDOCAINE HYDROCHLORIDE 10 MG/ML
0.5 INJECTION, SOLUTION EPIDURAL; INFILTRATION; INTRACAUDAL; PERINEURAL ONCE AS NEEDED
Status: DISCONTINUED | OUTPATIENT
Start: 2022-02-28 | End: 2022-02-28 | Stop reason: HOSPADM

## 2022-02-28 RX ADMIN — HYDROCODONE BITARTRATE AND ACETAMINOPHEN 1 TABLET: 5; 325 TABLET ORAL at 15:29

## 2022-02-28 RX ADMIN — ACETAMINOPHEN 1000 MG: 500 TABLET ORAL at 11:44

## 2022-02-28 RX ADMIN — DEXAMETHASONE SODIUM PHOSPHATE 8 MG: 10 INJECTION INTRAMUSCULAR; INTRAVENOUS at 13:32

## 2022-02-28 RX ADMIN — NEOSTIGMINE METHYLSULFATE 3 MG: 0.5 INJECTION INTRAVENOUS at 14:23

## 2022-02-28 RX ADMIN — SODIUM CHLORIDE, POTASSIUM CHLORIDE, SODIUM LACTATE AND CALCIUM CHLORIDE 9 ML/HR: 600; 310; 30; 20 INJECTION, SOLUTION INTRAVENOUS at 11:28

## 2022-02-28 RX ADMIN — GLYCOPYRROLATE 0.1 MG: 0.2 INJECTION INTRAMUSCULAR; INTRAVENOUS at 13:32

## 2022-02-28 RX ADMIN — GLYCOPYRROLATE 0.3 MG: 0.2 INJECTION INTRAMUSCULAR; INTRAVENOUS at 14:23

## 2022-02-28 RX ADMIN — ONDANSETRON 4 MG: 2 INJECTION INTRAMUSCULAR; INTRAVENOUS at 14:23

## 2022-02-28 RX ADMIN — FENTANYL CITRATE 50 MCG: 50 INJECTION INTRAMUSCULAR; INTRAVENOUS at 14:34

## 2022-02-28 RX ADMIN — FENTANYL CITRATE 50 MCG: 50 INJECTION INTRAMUSCULAR; INTRAVENOUS at 15:09

## 2022-02-28 RX ADMIN — LIDOCAINE HYDROCHLORIDE 100 MG: 20 INJECTION, SOLUTION INFILTRATION; PERINEURAL at 13:32

## 2022-02-28 RX ADMIN — FENTANYL CITRATE 50 MCG: 50 INJECTION INTRAMUSCULAR; INTRAVENOUS at 13:32

## 2022-02-28 RX ADMIN — ONDANSETRON 4 MG: 2 INJECTION INTRAMUSCULAR; INTRAVENOUS at 15:13

## 2022-02-28 RX ADMIN — KETOROLAC TROMETHAMINE 30 MG: 30 INJECTION, SOLUTION INTRAMUSCULAR at 14:23

## 2022-02-28 RX ADMIN — PROPOFOL 200 MG: 10 INJECTION, EMULSION INTRAVENOUS at 13:32

## 2022-02-28 RX ADMIN — ROCURONIUM BROMIDE 40 MG: 50 INJECTION INTRAVENOUS at 13:32

## 2022-02-28 NOTE — ANESTHESIA PROCEDURE NOTES
Airway  Urgency: elective    Date/Time: 2/28/2022 1:35 PM  Airway not difficult    General Information and Staff    Patient location during procedure: OR  Anesthesiologist: Neto Taylor MD  CRNA: Serina Whiteside CRNA    Indications and Patient Condition  Indications for airway management: airway protection    Preoxygenated: yes  MILS maintained throughout  Mask difficulty assessment: 1 - vent by mask    Final Airway Details  Final airway type: endotracheal airway      Successful airway: ETT    Successful intubation technique: direct laryngoscopy  Facilitating devices/methods: cricoid pressure  Endotracheal tube insertion site: oral  Blade: Jesus  Blade size: 3  ETT size (mm): 7.0  Cormack-Lehane Classification: grade I - full view of glottis  Placement verified by: chest auscultation and capnometry   Measured from: teeth  ETT/EBT  to teeth (cm): 21  Number of attempts at approach: 1  Assessment: lips, teeth, and gum same as pre-op and atraumatic intubation

## 2022-02-28 NOTE — ANESTHESIA POSTPROCEDURE EVALUATION
Patient: Zora Ashraf    Procedure Summary     Date: 02/28/22 Room / Location:  [facility]    Anesthesia Start: 1327 Anesthesia Stop: 1451    Procedures:       LAPAROSCOPY, DIAGNOSTIC (N/A Abdomen)      HYSTEROSCOPY /DILATATION AND CURETTAGE WITH MYOSURE (N/A Uterus) Diagnosis:     Surgeons: Janine Alamo MD Provider: Neto Taylor MD    Anesthesia Type: general ASA Status: 1          Anesthesia Type: general    Vitals  Vitals Value Taken Time   /68 02/28/22 1546   Temp 36.9 °C (98.4 °F) 02/28/22 1545   Pulse 54 02/28/22 1552   Resp 16 02/28/22 1545   SpO2 99 % 02/28/22 1552   Vitals shown include unvalidated device data.        Post Anesthesia Care and Evaluation    Patient location during evaluation: bedside  Patient participation: complete - patient participated  Level of consciousness: awake and alert  Pain management: adequate  Airway patency: patent  Anesthetic complications: No anesthetic complications  PONV Status: none  Cardiovascular status: acceptable  Respiratory status: acceptable  Hydration status: acceptable    Comments: /80 (BP Location: Left arm, Patient Position: Lying)   Pulse 82   Temp 36.9 °C (98.4 °F) (Temporal)   Resp 18   LMP 02/18/2022   SpO2 100%

## 2022-02-28 NOTE — ANESTHESIA PREPROCEDURE EVALUATION
Anesthesia Evaluation     NPO Solid Status: > 8 hours             Airway   Mallampati: I  TM distance: >3 FB  Neck ROM: full  No difficulty expected  Dental - normal exam     Pulmonary - negative pulmonary ROS and normal exam   Cardiovascular - negative cardio ROS and normal exam        Neuro/Psych- negative ROS  GI/Hepatic/Renal/Endo - negative ROS     Musculoskeletal     Abdominal    Substance History      OB/GYN          Other                        Anesthesia Plan    ASA 1     general   (  D/W R&B of GA including but not limited to: heart, lung, liver, kidney, neurologic problems, positioning injuries, dental damage, corneal abrasion and TMJ.  .)  intravenous induction     Anesthetic plan, all risks, benefits, and alternatives have been provided, discussed and informed consent has been obtained with: patient.        CODE STATUS:

## 2022-03-01 DIAGNOSIS — L70.0 CYSTIC ACNE: ICD-10-CM

## 2022-03-01 DIAGNOSIS — E28.2 PCOS (POLYCYSTIC OVARIAN SYNDROME): ICD-10-CM

## 2022-03-01 RX ORDER — SPIRONOLACTONE 50 MG/1
75 TABLET, FILM COATED ORAL DAILY
Qty: 135 TABLET | Refills: 0 | Status: SHIPPED | OUTPATIENT
Start: 2022-03-01 | End: 2022-05-31

## 2022-03-02 LAB
LAB AP CASE REPORT: NORMAL
PATH REPORT.FINAL DX SPEC: NORMAL
PATH REPORT.GROSS SPEC: NORMAL

## 2022-05-29 DIAGNOSIS — E28.2 PCOS (POLYCYSTIC OVARIAN SYNDROME): ICD-10-CM

## 2022-05-29 DIAGNOSIS — L70.0 CYSTIC ACNE: ICD-10-CM

## 2022-05-31 RX ORDER — SPIRONOLACTONE 50 MG/1
TABLET, FILM COATED ORAL
Qty: 135 TABLET | Refills: 0 | Status: SHIPPED | OUTPATIENT
Start: 2022-05-31

## 2025-02-07 ENCOUNTER — READMISSION MANAGEMENT (OUTPATIENT)
Dept: CALL CENTER | Facility: HOSPITAL | Age: 35
End: 2025-02-07
Payer: COMMERCIAL

## 2025-02-07 NOTE — OUTREACH NOTE
Prep Survey      Flowsheet Row Responses   [facility] [place] by [facility] facility patient discharged from? Non-BH   Is LACE score < 7 ? Non-BH Discharge   Eligibility Not Eligible   What are the reasons patient is not eligible? Other  [OB patient]   Does the patient have one of the following disease processes/diagnoses(primary or secondary)? Other   Prep survey completed? Yes            Rosio TRAYLOR - Registered Nurse           sedated MRI sedated MRI of brain

## (undated) DEVICE — UNDYED BRAIDED (POLYGLACTIN 910), SYNTHETIC ABSORBABLE SUTURE: Brand: COATED VICRYL

## (undated) DEVICE — DEV TISS REMOV MYOSURE REACH

## (undated) DEVICE — 2, DISPOSABLE SUCTION/IRRIGATOR WITH DISPOSABLE TIP: Brand: STRYKEFLOW

## (undated) DEVICE — ENDOCUT SCISSOR TIP, DISPOSABLE: Brand: RENEW

## (undated) DEVICE — TUBING, SUCTION, 1/4" X 20', STRAIGHT: Brand: MEDLINE INDUSTRIES, INC.

## (undated) DEVICE — TROCAR: Brand: KII SLEEVE

## (undated) DEVICE — SUT VIC 0 TN 27IN DYED JTN0G

## (undated) DEVICE — Device: Brand: DEFENDO AIR/WATER/SUCTION AND BIOPSY VALVE

## (undated) DEVICE — TROCAR: Brand: KII OPTICAL ACCESS SYSTEM

## (undated) DEVICE — TUBING, SUCTION, 1/4" X 10', STRAIGHT: Brand: MEDLINE

## (undated) DEVICE — ADHS SKIN SURG TISS VISC PREMIERPRO EXOFIN HI/VISC FAST/DRY

## (undated) DEVICE — NDL HYPO ECLPS SFTY 22G 1 1/2IN

## (undated) DEVICE — OSC HYSTEROSCOPY: Brand: MEDLINE INDUSTRIES, INC.

## (undated) DEVICE — DRAPE,UNDERBUTTOCKS,PCH,STERILE: Brand: MEDLINE

## (undated) DEVICE — THE EASYGRIP FLO-41 PRECISION MIS DELIVERY SYSTEM (EASYGRIP FLO-41 SYSTEM) IS A STERILE, SINGLE-USE DEVICE THAT CONSISTS OF TWO COMPONENTS: (1) ONE APPLICATOR DEVICE WITH A 41 CM LONG CANNULA (5 MM OUTER DIAMETER) AND (2) ONE EMPTY 1.5 ML SYRINGE.: Brand: EASYGRIP FLO-41

## (undated) DEVICE — ST TBG ENDOMAT HYSTSCPY

## (undated) DEVICE — THE TORRENT IRRIGATION SCOPE CONNECTOR IS USED WITH THE TORRENT IRRIGATION TUBING TO PROVIDE IRRIGATION FLUIDS SUCH AS STERILE WATER DURING GASTROINTESTINAL ENDOSCOPIC PROCEDURES WHEN USED IN CONJUNCTION WITH AN IRRIGATION PUMP (OR ELECTROSURGICAL UNIT).: Brand: TORRENT

## (undated) DEVICE — SINGLE-USE BIOPSY FORCEPS: Brand: RADIAL JAW 4

## (undated) DEVICE — HARMONIC ACE +7 LAPAROSCOPIC SHEARS ADVANCED HEMOSTASIS 5MM DIAMETER 36CM SHAFT LENGTH  FOR USE WITH GRAY HAND PIECE ONLY: Brand: HARMONIC ACE

## (undated) DEVICE — PATIENT RETURN ELECTRODE, SINGLE-USE, CONTACT QUALITY MONITORING, ADULT, WITH 9FT CORD, FOR PATIENTS WEIGING OVER 33LBS. (15KG): Brand: MEGADYNE

## (undated) DEVICE — GLV SURG SIGNATURE ESSENTIAL PF LTX SZ7

## (undated) DEVICE — CANNULA,ADULT,SOFT-TOUCH,7'TUBE,UC: Brand: PENDING

## (undated) DEVICE — GLV SURG BIOGEL LTX PF 6 1/2

## (undated) DEVICE — VISUALIZATION SYSTEM: Brand: CLEARIFY

## (undated) DEVICE — DRSNG TELFA PAD NONADH STR 1S 3X4IN

## (undated) DEVICE — PK LAP GYN TOWER 40

## (undated) DEVICE — SEAL HYSTERSCOPE/OUTFLOW CHANNEL MYOSURE